# Patient Record
Sex: MALE | Race: WHITE | Employment: OTHER | URBAN - METROPOLITAN AREA
[De-identification: names, ages, dates, MRNs, and addresses within clinical notes are randomized per-mention and may not be internally consistent; named-entity substitution may affect disease eponyms.]

---

## 2017-01-24 ENCOUNTER — OP HISTORICAL/CONVERTED ENCOUNTER (OUTPATIENT)
Dept: OTHER | Age: 82
End: 2017-01-24

## 2017-04-04 ENCOUNTER — OP HISTORICAL/CONVERTED ENCOUNTER (OUTPATIENT)
Dept: OTHER | Age: 82
End: 2017-04-04

## 2017-04-19 ENCOUNTER — OP HISTORICAL/CONVERTED ENCOUNTER (OUTPATIENT)
Dept: OTHER | Age: 82
End: 2017-04-19

## 2017-05-12 ENCOUNTER — OP HISTORICAL/CONVERTED ENCOUNTER (OUTPATIENT)
Dept: OTHER | Age: 82
End: 2017-05-12

## 2018-02-01 ENCOUNTER — OP HISTORICAL/CONVERTED ENCOUNTER (OUTPATIENT)
Dept: OTHER | Age: 83
End: 2018-02-01

## 2018-07-02 ENCOUNTER — OP HISTORICAL/CONVERTED ENCOUNTER (OUTPATIENT)
Dept: OTHER | Age: 83
End: 2018-07-02

## 2018-11-05 ENCOUNTER — OP HISTORICAL/CONVERTED ENCOUNTER (OUTPATIENT)
Dept: OTHER | Age: 83
End: 2018-11-05

## 2020-01-21 ENCOUNTER — ED HISTORICAL/CONVERTED ENCOUNTER (OUTPATIENT)
Dept: OTHER | Age: 85
End: 2020-01-21

## 2020-05-06 LAB
CREATININE, EXTERNAL: 117
LDL-C, EXTERNAL: 139
PSA, EXTERNAL: 5.9

## 2020-05-08 ENCOUNTER — OP HISTORICAL/CONVERTED ENCOUNTER (OUTPATIENT)
Dept: OTHER | Age: 85
End: 2020-05-08

## 2020-10-06 VITALS
SYSTOLIC BLOOD PRESSURE: 130 MMHG | OXYGEN SATURATION: 98 % | DIASTOLIC BLOOD PRESSURE: 78 MMHG | BODY MASS INDEX: 23.13 KG/M2 | RESPIRATION RATE: 18 BRPM | HEIGHT: 70 IN | WEIGHT: 161.6 LBS | HEART RATE: 74 BPM | TEMPERATURE: 98.1 F

## 2020-10-06 PROBLEM — K21.9 GERD (GASTROESOPHAGEAL REFLUX DISEASE): Status: ACTIVE | Noted: 2020-10-06

## 2020-10-06 PROBLEM — I10 ESSENTIAL HYPERTENSION: Status: ACTIVE | Noted: 2020-10-06

## 2020-10-06 PROBLEM — M51.36 DEGENERATION OF LUMBAR INTERVERTEBRAL DISC: Status: ACTIVE | Noted: 2020-10-06

## 2020-10-06 PROBLEM — E55.9 VITAMIN D DEFICIENCY: Status: ACTIVE | Noted: 2020-10-06

## 2020-10-06 PROBLEM — G43.909 MIGRAINE: Status: ACTIVE | Noted: 2020-10-06

## 2020-10-06 PROBLEM — Z87.438 HISTORY OF BPH: Status: ACTIVE | Noted: 2020-10-06

## 2020-10-06 PROBLEM — N18.9 CHRONIC KIDNEY DISEASE (CKD): Status: ACTIVE | Noted: 2020-10-06

## 2020-10-06 PROBLEM — G56.00 CARPAL TUNNEL SYNDROME: Status: ACTIVE | Noted: 2020-10-06

## 2020-10-06 PROBLEM — Z86.79 HISTORY OF ATRIAL FIBRILLATION: Status: ACTIVE | Noted: 2020-10-06

## 2020-10-06 PROBLEM — E78.00 PURE HYPERCHOLESTEROLEMIA: Status: ACTIVE | Noted: 2020-10-06

## 2020-10-07 ENCOUNTER — OFFICE VISIT (OUTPATIENT)
Dept: INTERNAL MEDICINE CLINIC | Age: 85
End: 2020-10-07
Payer: MEDICARE

## 2020-10-07 VITALS
DIASTOLIC BLOOD PRESSURE: 74 MMHG | HEART RATE: 60 BPM | TEMPERATURE: 97 F | HEIGHT: 70 IN | BODY MASS INDEX: 23.45 KG/M2 | OXYGEN SATURATION: 98 % | RESPIRATION RATE: 18 BRPM | WEIGHT: 163.8 LBS | SYSTOLIC BLOOD PRESSURE: 122 MMHG

## 2020-10-07 DIAGNOSIS — M51.36 DEGENERATION OF LUMBAR INTERVERTEBRAL DISC: ICD-10-CM

## 2020-10-07 DIAGNOSIS — Z87.438 HISTORY OF BPH: ICD-10-CM

## 2020-10-07 DIAGNOSIS — I10 ESSENTIAL HYPERTENSION: Primary | ICD-10-CM

## 2020-10-07 DIAGNOSIS — E78.00 PURE HYPERCHOLESTEROLEMIA: ICD-10-CM

## 2020-10-07 DIAGNOSIS — Z23 NEEDS FLU SHOT: ICD-10-CM

## 2020-10-07 DIAGNOSIS — Z23 ENCOUNTER FOR IMMUNIZATION: ICD-10-CM

## 2020-10-07 DIAGNOSIS — K21.9 GASTROESOPHAGEAL REFLUX DISEASE WITHOUT ESOPHAGITIS: ICD-10-CM

## 2020-10-07 DIAGNOSIS — Z86.79 HISTORY OF ATRIAL FIBRILLATION: ICD-10-CM

## 2020-10-07 PROCEDURE — G8536 NO DOC ELDER MAL SCRN: HCPCS | Performed by: INTERNAL MEDICINE

## 2020-10-07 PROCEDURE — G8427 DOCREV CUR MEDS BY ELIG CLIN: HCPCS | Performed by: INTERNAL MEDICINE

## 2020-10-07 PROCEDURE — G8510 SCR DEP NEG, NO PLAN REQD: HCPCS | Performed by: INTERNAL MEDICINE

## 2020-10-07 PROCEDURE — 90756 CCIIV4 VACC ABX FREE IM: CPT | Performed by: INTERNAL MEDICINE

## 2020-10-07 PROCEDURE — 3288F FALL RISK ASSESSMENT DOCD: CPT | Performed by: INTERNAL MEDICINE

## 2020-10-07 PROCEDURE — 99213 OFFICE O/P EST LOW 20 MIN: CPT | Performed by: INTERNAL MEDICINE

## 2020-10-07 PROCEDURE — G0008 ADMIN INFLUENZA VIRUS VAC: HCPCS | Performed by: INTERNAL MEDICINE

## 2020-10-07 PROCEDURE — 1100F PTFALLS ASSESS-DOCD GE2>/YR: CPT | Performed by: INTERNAL MEDICINE

## 2020-10-07 PROCEDURE — G8420 CALC BMI NORM PARAMETERS: HCPCS | Performed by: INTERNAL MEDICINE

## 2020-10-07 RX ORDER — OMEPRAZOLE 40 MG/1
40 CAPSULE, DELAYED RELEASE ORAL DAILY
Qty: 90 CAP | Refills: 3 | Status: SHIPPED | OUTPATIENT
Start: 2020-10-07 | End: 2020-10-07 | Stop reason: SDUPTHER

## 2020-10-07 RX ORDER — ZOLMITRIPTAN 2.5 MG/1
2.5 TABLET, FILM COATED ORAL AS NEEDED
Qty: 12 TAB | Refills: 3 | Status: SHIPPED | OUTPATIENT
Start: 2020-10-07 | End: 2022-03-10 | Stop reason: SDDI

## 2020-10-07 RX ORDER — OMEPRAZOLE 40 MG/1
40 CAPSULE, DELAYED RELEASE ORAL DAILY
Qty: 90 CAP | Refills: 3 | Status: SHIPPED | OUTPATIENT
Start: 2020-10-07 | End: 2022-03-10 | Stop reason: SDDI

## 2020-10-07 RX ORDER — ZOLMITRIPTAN 2.5 MG/1
1 TABLET, FILM COATED ORAL AS NEEDED
COMMUNITY
Start: 2020-04-13 | End: 2020-10-07 | Stop reason: SDUPTHER

## 2020-10-07 RX ORDER — OMEPRAZOLE 40 MG/1
1 CAPSULE, DELAYED RELEASE ORAL DAILY
COMMUNITY
End: 2020-10-07 | Stop reason: SDUPTHER

## 2020-10-07 NOTE — PROGRESS NOTES
John Matt is a 80 y.o. male and presents with Hypertension; Cholesterol Problem; and Follow Up Chronic Condition    He has stopped taking diltiazem and pravastatin, he has his own fixed thoughts, today his blood pressure readings are,Controlled, he has history of atrial fibrillation, he is not wearing hearing aid machine in the office also which  makes it challenging for me for conversation, he needs refills for omeprazole he thinks that he has headache due to migraine and he keeps taking Zomig as needed, he does not want to try any Tylenol, at all and does not want to get off from Zomig, he also follows at, [facility] he does not have dizziness he does not want to use cane for balance, sometimes he told me he cannot walk straight, however at the same time, he does not want to use cane for support or to prevent fall he eats every day at [facility], his wife cannot prepare the meal having macular degeneration, he does not have depression, he has a history of DJD of joints and BPH and sciatica but he thinks everything is in control, hypertension      Review of Systems    Review of Systems   Constitutional: Negative. HENT: Positive for hearing loss. Negative for sinus pain. Dose not wear hearing aid all the time. he has chronic hearing loss. Eyes: Negative for blurred vision. Respiratory: Negative for cough, shortness of breath and wheezing. Cardiovascular: Negative. Gastrointestinal: Negative. Genitourinary: Negative. Musculoskeletal: Negative. Neurological: Negative for dizziness, tingling, tremors and headaches. Psychiatric/Behavioral: Negative for depression. The patient is not nervous/anxious.          Past Medical History:   Diagnosis Date    Arrhythmia     Arthritis     Calculus of kidney     GERD (gastroesophageal reflux disease)     Headache     Hypercholesterolemia     Hypertension     Prostate disease      Past Surgical History:   Procedure Laterality Date    HX CHOLECYSTECTOMY      HX COLONOSCOPY      HX GI      HX PROSTATE SURGERY      prostate biopsy      Social History     Socioeconomic History    Marital status:      Spouse name: Not on file    Number of children: Not on file    Years of education: Not on file    Highest education level: Not on file   Tobacco Use    Smoking status: Never Smoker    Smokeless tobacco: Never Used   Substance and Sexual Activity    Alcohol use: Not Currently    Drug use: Never     Family History   Problem Relation Age of Onset    Heart Disease Mother     No Known Problems Father      Current Outpatient Medications   Medication Sig Dispense Refill    ZOLMitriptan (ZOMIG) 2.5 mg tablet Take 1 Tab by mouth as needed for Migraine. Indications: a migraine headache 12 Tab 3    omeprazole (PRILOSEC) 40 mg capsule Take 1 Cap by mouth daily. Take 30 minutes before eating once a day. Indications: gastroesophageal reflux disease 90 Cap 3     Allergies   Allergen Reactions    Hydrocodone Other (comments)     Side effects in form of balance problem    Sulfa (Sulfonamide Antibiotics) Rash       Objective:  Visit Vitals  /74 (BP 1 Location: Right arm, BP Patient Position: Sitting)   Pulse 60   Temp 97 °F (36.1 °C) (Temporal)   Resp 18   Ht 5' 9.5\" (1.765 m)   Wt 163 lb 12.8 oz (74.3 kg)   SpO2 98%   BMI 23.84 kg/m²       Physical Exam:   Constitutional: General Appearance: age appropriate. . Level of Distress: NAD. Psychiatric: Mental Status: normal mood and affect Orientation: to time, place, and person. ,normal eye contact. Head: Head: normocephalic and atraumatic. Eyes: Pupils: PERRLA. Sclerae: non-icteric. Neck: Neck: supple, trachea midline, and no masses. Lymph Nodes: no cervical LAD. Thyroid: no enlargement or nodules and non-tender. Lungs: Respiratory effort: no dyspnea. Auscultation: no wheezing, rales/crackles, or rhonchi and breath sounds normal and good air movement.   Cardiovascular: Apical Impulse: not displaced. Heart Auscultation: normal S1 and S2; no murmurs, rubs, or gallops; and RRR. Neck vessels: no carotid bruits. Pulses including femoral / pedal: normal throughout. Abdomen: Bowel Sounds: normal. Inspection and Palpation: no tenderness, guarding, or masses and soft and non-distended. Liver: non-tender and no hepatomegaly. Spleen: non-tender and no splenomegaly. Musculoskeletal[de-identified] Extremities: no edema,no varicosities. No Calf tenderness. Neurologic: Gait and Station: normal gait and station. Motor Strength normal right and left. Skin: Inspection and palpation: no rash, lesions, or ulcer. Results for orders placed or performed in visit on 10/06/20   AMB EXT LDL-C   Result Value Ref Range    LDL-C, External 139    AMB EXT CREATININE   Result Value Ref Range    Creatinine, External 117    AMB EXT PSA   Result Value Ref Range    PSA, External 5.9        Assessment/Plan:      ICD-10-CM ICD-9-CM    1. Essential hypertension  I10 401.9 CBC WITH AUTOMATED DIFF      METABOLIC PANEL, COMPREHENSIVE   2. Gastroesophageal reflux disease without esophagitis  K21.9 530.81    3. Pure hypercholesterolemia  E78.00 272.0 LIPID PANEL   4. Degeneration of lumbar intervertebral disc  M51.36 722.52    5. History of atrial fibrillation  Z86.79 V12.59    6. History of BPH  Z87.438 V13.89    7. Encounter for immunization  Z23 V03.89      Orders Placed This Encounter    CBC WITH AUTOMATED DIFF    METABOLIC PANEL, COMPREHENSIVE    LIPID PANEL    DISCONTD: ZOLMitriptan (ZOMIG) 2.5 mg tablet     Sig: Take 1 Tab by mouth as needed.  DISCONTD: omeprazole (PRILOSEC) 40 mg capsule     Sig: Take 1 Cap by mouth daily.  DISCONTD: omeprazole (PRILOSEC) 40 mg capsule     Sig: Take 1 Cap by mouth daily. Take 30 minutes before eating once a day.   Indications: gastroesophageal reflux disease     Dispense:  90 Cap     Refill:  3    ZOLMitriptan (ZOMIG) 2.5 mg tablet     Sig: Take 1 Tab by mouth as needed for Migraine. Indications: a migraine headache     Dispense:  12 Tab     Refill:  3    omeprazole (PRILOSEC) 40 mg capsule     Sig: Take 1 Cap by mouth daily. Take 30 minutes before eating once a day. Indications: gastroesophageal reflux disease     Dispense:  90 Cap     Refill:  3     Hypertension with history of atrial fibrillation he had stopped taking diltiazem, ER  120 mg once a day by himself, he did not bring blood pressure log. [facility] better diet low in sodium and DASH diet.    , History of atrial fibrillation status post surgery and no recurrence he does not want to be on low-dose aspirin. History of BPH currently asymptomatic and had biopsy which was negative for malignancy in the past he had elevated PSA. History of migraine, he does not want to get off from Zomig he needs refills he does not want to try one Tylenol, he thinks that Zomig is the only medicine that is helping him and helped him in the past.    GERD he is eating outside at [facility] he has no more weight loss his wife cannot prepare the meal due to macular degeneration of eyes both of them are eating every day for lunch outside I ordered labs, he wants omeprazole and he takes every day,Refill sent to [facility]. Education and counseling given. Flu vaccine given in the office. Labs ordered for next 3 months and follow-up in 3 months. Strongly recommended to wear hearing aid while in the office only during, visit.,  For the better understanding and communication, according to him it is uncomfortable with the mask and with his glasses however if he wear, during medical conversation, for short period of time for 10 minutes it will be better, explained him.         bring BP log to office visit, follow low fat diet, follow low salt diet, routine labs ordered, have labs drawn prior to ROV, call if any problems    There are no Patient Instructions on file for this visit. Follow-up and Dispositions    · Return in about 3 months (around 1/7/2021) for htn ,servando ,fasting labs in 3 months.

## 2020-11-06 PROBLEM — Z23 ENCOUNTER FOR IMMUNIZATION: Status: RESOLVED | Noted: 2020-10-07 | Resolved: 2020-11-06

## 2021-01-05 LAB
ALBUMIN SERPL-MCNC: 4.2 G/DL (ref 3.6–4.6)
ALBUMIN/GLOB SERPL: 1.6 {RATIO} (ref 1.2–2.2)
ALP SERPL-CCNC: 95 IU/L (ref 39–117)
ALT SERPL-CCNC: 14 IU/L (ref 0–44)
AST SERPL-CCNC: 15 IU/L (ref 0–40)
BASOPHILS # BLD AUTO: 0.1 X10E3/UL (ref 0–0.2)
BASOPHILS NFR BLD AUTO: 1 %
BILIRUB SERPL-MCNC: 0.5 MG/DL (ref 0–1.2)
BUN SERPL-MCNC: 15 MG/DL (ref 8–27)
BUN/CREAT SERPL: 12 (ref 10–24)
CALCIUM SERPL-MCNC: 9.2 MG/DL (ref 8.6–10.2)
CHLORIDE SERPL-SCNC: 107 MMOL/L (ref 96–106)
CHOLEST SERPL-MCNC: 170 MG/DL (ref 100–199)
CO2 SERPL-SCNC: 21 MMOL/L (ref 20–29)
CREAT SERPL-MCNC: 1.21 MG/DL (ref 0.76–1.27)
EOSINOPHIL # BLD AUTO: 0.2 X10E3/UL (ref 0–0.4)
EOSINOPHIL NFR BLD AUTO: 4 %
ERYTHROCYTE [DISTWIDTH] IN BLOOD BY AUTOMATED COUNT: 12.9 % (ref 11.6–15.4)
GLOBULIN SER CALC-MCNC: 2.7 G/DL (ref 1.5–4.5)
GLUCOSE SERPL-MCNC: 86 MG/DL (ref 65–99)
HCT VFR BLD AUTO: 42.9 % (ref 37.5–51)
HDLC SERPL-MCNC: 61 MG/DL
HGB BLD-MCNC: 15 G/DL (ref 13–17.7)
IMM GRANULOCYTES # BLD AUTO: 0 X10E3/UL (ref 0–0.1)
IMM GRANULOCYTES NFR BLD AUTO: 0 %
LDLC SERPL CALC-MCNC: 98 MG/DL (ref 0–99)
LYMPHOCYTES # BLD AUTO: 1.6 X10E3/UL (ref 0.7–3.1)
LYMPHOCYTES NFR BLD AUTO: 28 %
MCH RBC QN AUTO: 32.1 PG (ref 26.6–33)
MCHC RBC AUTO-ENTMCNC: 35 G/DL (ref 31.5–35.7)
MCV RBC AUTO: 92 FL (ref 79–97)
MONOCYTES # BLD AUTO: 0.6 X10E3/UL (ref 0.1–0.9)
MONOCYTES NFR BLD AUTO: 10 %
NEUTROPHILS # BLD AUTO: 3.3 X10E3/UL (ref 1.4–7)
NEUTROPHILS NFR BLD AUTO: 57 %
PLATELET # BLD AUTO: 140 X10E3/UL (ref 150–450)
POTASSIUM SERPL-SCNC: 4 MMOL/L (ref 3.5–5.2)
PROT SERPL-MCNC: 6.9 G/DL (ref 6–8.5)
RBC # BLD AUTO: 4.67 X10E6/UL (ref 4.14–5.8)
SODIUM SERPL-SCNC: 142 MMOL/L (ref 134–144)
TRIGL SERPL-MCNC: 53 MG/DL (ref 0–149)
VLDLC SERPL CALC-MCNC: 11 MG/DL (ref 5–40)
WBC # BLD AUTO: 5.8 X10E3/UL (ref 3.4–10.8)

## 2021-01-07 ENCOUNTER — OFFICE VISIT (OUTPATIENT)
Dept: INTERNAL MEDICINE CLINIC | Age: 86
End: 2021-01-07
Payer: MEDICARE

## 2021-01-07 VITALS
WEIGHT: 164.2 LBS | BODY MASS INDEX: 23.51 KG/M2 | HEART RATE: 60 BPM | SYSTOLIC BLOOD PRESSURE: 130 MMHG | DIASTOLIC BLOOD PRESSURE: 83 MMHG | OXYGEN SATURATION: 98 % | HEIGHT: 70 IN | RESPIRATION RATE: 16 BRPM

## 2021-01-07 DIAGNOSIS — G43.909 MIGRAINE WITHOUT STATUS MIGRAINOSUS, NOT INTRACTABLE, UNSPECIFIED MIGRAINE TYPE: ICD-10-CM

## 2021-01-07 DIAGNOSIS — Z86.79 HISTORY OF ATRIAL FIBRILLATION: ICD-10-CM

## 2021-01-07 DIAGNOSIS — K21.9 GASTROESOPHAGEAL REFLUX DISEASE WITHOUT ESOPHAGITIS: ICD-10-CM

## 2021-01-07 DIAGNOSIS — I10 ESSENTIAL HYPERTENSION: ICD-10-CM

## 2021-01-07 DIAGNOSIS — E78.00 PURE HYPERCHOLESTEROLEMIA: ICD-10-CM

## 2021-01-07 DIAGNOSIS — M51.36 DEGENERATION OF LUMBAR INTERVERTEBRAL DISC: Primary | ICD-10-CM

## 2021-01-07 DIAGNOSIS — Z87.438 HISTORY OF BPH: ICD-10-CM

## 2021-01-07 PROCEDURE — 99214 OFFICE O/P EST MOD 30 MIN: CPT | Performed by: INTERNAL MEDICINE

## 2021-01-07 RX ORDER — PRAVASTATIN SODIUM 40 MG/1
40 TABLET ORAL
COMMUNITY
End: 2021-07-07 | Stop reason: SDDI

## 2021-01-07 RX ORDER — DILTIAZEM HYDROCHLORIDE 120 MG/1
120 CAPSULE, EXTENDED RELEASE ORAL DAILY
COMMUNITY
End: 2021-12-13 | Stop reason: SDUPTHER

## 2021-01-07 NOTE — PROGRESS NOTES
Berna Barnard is a 80 y.o. male and presents with Follow Up Chronic Condition (htn ,servando )    Mr. Janetta Cogan came and, today his blood pressure is wonderfully controlled no recent flareup of migraine, his wife is having macular degeneration, she cannot cook or prepare the meal at home, Mr. Janetta Cogan  takes lunch and brunch at [facility] in maintaining his nutrition, he is trying to eat balanced diet, no flareup of acid reflux he has impaired hearing since long he does not wear hearing aid every day, he has history of atrial fibrillation but currently none and history of sciatica but currently asymptomatic independent for ADL and IADL and his wife had hemoglobin around 7 who did not go to to take PRBC, and patient wanted name of hematologist that she used to see and I prepared referral for his wife and I prepared prescription for his wife, while he was here. He is going to see primary care physician at [facility] in June and will do his blood work that he wants to see me after month of June. Today I reviewed his labs and discussed with him. His labs are stable at his age of 80. He has no negative thoughts or depression. Review of Systems    Review of Systems   Constitutional: Negative. HENT: Negative for sinus pain and sore throat. Eyes: Negative for blurred vision. Respiratory: Negative for cough, shortness of breath and wheezing. Cardiovascular: Negative for chest pain and PND. Gastrointestinal: Negative. Genitourinary: Negative. Musculoskeletal: Negative. Neurological: Negative for dizziness, tingling, tremors and headaches. Endo/Heme/Allergies: Negative for polydipsia. Psychiatric/Behavioral: Negative for depression. The patient does not have insomnia.          Past Medical History:   Diagnosis Date    Arrhythmia     Arthritis     Calculus of kidney     GERD (gastroesophageal reflux disease)     Headache     Hypercholesterolemia     Hypertension     Prostate disease      Past Surgical History:   Procedure Laterality Date    HX CHOLECYSTECTOMY      HX COLONOSCOPY      HX GI      HX PROSTATE SURGERY      prostate biopsy      Social History     Socioeconomic History    Marital status:      Spouse name: Not on file    Number of children: Not on file    Years of education: Not on file    Highest education level: Not on file   Tobacco Use    Smoking status: Never Smoker    Smokeless tobacco: Never Used   Substance and Sexual Activity    Alcohol use: Not Currently    Drug use: Never     Family History   Problem Relation Age of Onset    Heart Disease Mother     No Known Problems Father      Current Outpatient Medications   Medication Sig Dispense Refill    dilTIAZem ER (TIAZAC) 120 mg capsule Take 120 mg by mouth daily.  pravastatin (PRAVACHOL) 40 mg tablet Take 40 mg by mouth nightly.  ZOLMitriptan (ZOMIG) 2.5 mg tablet Take 1 Tab by mouth as needed for Migraine. Indications: a migraine headache 12 Tab 3    omeprazole (PRILOSEC) 40 mg capsule Take 1 Cap by mouth daily. Take 30 minutes before eating once a day. Indications: gastroesophageal reflux disease 90 Cap 3     Allergies   Allergen Reactions    Hydrocodone Other (comments)     Side effects in form of balance problem    Sulfa (Sulfonamide Antibiotics) Rash       Objective:  Visit Vitals  /83 (BP 1 Location: Left arm, BP Patient Position: Sitting)   Pulse 60   Resp 16   Ht 5' 9.5\" (1.765 m)   Wt 164 lb 3.2 oz (74.5 kg)   SpO2 98%   BMI 23.90 kg/m²       Physical Exam:   Constitutional: General Appearance: Pleasant. Level of Distress: NAD. Psychiatric: Mental Status: normal mood and affect Orientation: to time, place, and person. ,normal eye contact. Head: Head: normocephalic and atraumatic. Eyes: Pupils: PERRLA. Sclerae: non-icteric. Neck: Neck: supple, trachea midline, and no masses. Lymph Nodes: no cervical LAD. Thyroid: no enlargement or nodules and non-tender.    Lungs: Respiratory effort: no dyspnea. Auscultation: no wheezing, rales/crackles, or rhonchi and breath sounds normal and good air movement. Cardiovascular: Apical Impulse: not displaced. Heart Auscultation: normal S1 and S2; no murmurs, rubs, or gallops; and RRR. Neck vessels: no carotid bruits. Pulses including femoral / pedal: normal throughout. Abdomen: Bowel Sounds: normal. Inspection and Palpation: no tenderness, guarding, or masses and soft and non-distended. Liver: non-tender and no hepatomegaly. Spleen: non-tender and no splenomegaly. Musculoskeletal[de-identified] Extremities: no edema,no varicosities. No Calf tenderness. Neurologic: Gait and Station: normal gait and station. Motor Strength normal right and left. Skin: Inspection and palpation: no rash, lesions, or ulcer. Results for orders placed or performed in visit on 10/07/20   CBC WITH AUTOMATED DIFF   Result Value Ref Range    WBC 5.8 3.4 - 10.8 x10E3/uL    RBC 4.67 4.14 - 5.80 x10E6/uL    HGB 15.0 13.0 - 17.7 g/dL    HCT 42.9 37.5 - 51.0 %    MCV 92 79 - 97 fL    MCH 32.1 26.6 - 33.0 pg    MCHC 35.0 31.5 - 35.7 g/dL    RDW 12.9 11.6 - 15.4 %    PLATELET 476 (L) 509 - 450 x10E3/uL    NEUTROPHILS 57 Not Estab. %    Lymphocytes 28 Not Estab. %    MONOCYTES 10 Not Estab. %    EOSINOPHILS 4 Not Estab. %    BASOPHILS 1 Not Estab. %    ABS. NEUTROPHILS 3.3 1.4 - 7.0 x10E3/uL    Abs Lymphocytes 1.6 0.7 - 3.1 x10E3/uL    ABS. MONOCYTES 0.6 0.1 - 0.9 x10E3/uL    ABS. EOSINOPHILS 0.2 0.0 - 0.4 x10E3/uL    ABS. BASOPHILS 0.1 0.0 - 0.2 x10E3/uL    IMMATURE GRANULOCYTES 0 Not Estab. %    ABS. IMM.  GRANS. 0.0 0.0 - 0.1 Q25G9/KK   METABOLIC PANEL, COMPREHENSIVE   Result Value Ref Range    Glucose 86 65 - 99 mg/dL    BUN 15 8 - 27 mg/dL    Creatinine 1.21 0.76 - 1.27 mg/dL    GFR est non-AA 54 (L) >59 mL/min/1.73    GFR est AA 62 >59 mL/min/1.73    BUN/Creatinine ratio 12 10 - 24    Sodium 142 134 - 144 mmol/L    Potassium 4.0 3.5 - 5.2 mmol/L    Chloride 107 (H) 96 - 106 mmol/L    CO2 21 20 - 29 mmol/L    Calcium 9.2 8.6 - 10.2 mg/dL    Protein, total 6.9 6.0 - 8.5 g/dL    Albumin 4.2 3.6 - 4.6 g/dL    GLOBULIN, TOTAL 2.7 1.5 - 4.5 g/dL    A-G Ratio 1.6 1.2 - 2.2    Bilirubin, total 0.5 0.0 - 1.2 mg/dL    Alk. phosphatase 95 39 - 117 IU/L    AST (SGOT) 15 0 - 40 IU/L    ALT (SGPT) 14 0 - 44 IU/L   LIPID PANEL   Result Value Ref Range    Cholesterol, total 170 100 - 199 mg/dL    Triglyceride 53 0 - 149 mg/dL    HDL Cholesterol 61 >39 mg/dL    VLDL, calculated 11 5 - 40 mg/dL    LDL, calculated 98 0 - 99 mg/dL       Assessment/Plan:      ICD-10-CM ICD-9-CM    1. Degeneration of lumbar intervertebral disc  M51.36 722.52    2. Essential hypertension  I10 401.9    3. Pure hypercholesterolemia  E78.00 272.0    4. Gastroesophageal reflux disease without esophagitis  K21.9 530.81    5. Migraine without status migrainosus, not intractable, unspecified migraine type  G43.909 346.90    6. History of atrial fibrillation  Z86.79 V12.59    7. History of BPH  Z87.438 V13.89      Orders Placed This Encounter    dilTIAZem ER (TIAZAC) 120 mg capsule     Sig: Take 120 mg by mouth daily.  pravastatin (PRAVACHOL) 40 mg tablet     Sig: Take 40 mg by mouth nightly. Hypertension controlled continue diltiazem  mg once a day. Diet low in sodium. Hypercholesteremia controlled continue pravastatin 40 mg at bedtime. Diet low in fat. GERD continue omeprazole. He told me he has made changes in his diet that he eats at [facility].    History of migraine taking zolmitriptan as needed. History of sciatica with lumbar DJD currently asymptomatic. History of elevated PSA in the past but he had undergone biopsy and it was normal and he checks with [facility] and his PSA remained stable and he has no symptoms of abnormal urination. History of atrial fibrillation once status post ablation surgery and currently asymptomatic.     He also follows at [facility] with PCP and does his labs in June. Follow-up in 6 months. Answered all his questions and answered the questions for his wife sickness having anemia and I did everything for his wife also regarding prescription and referral to hematologist for his wife having anemia with thalassemia. His wife has macular degeneration. Who is my patient. Patient has no depression and independent for ADL and IADL and up to date for his flu vaccine. Patient has impaired hearing. Does not wear hearing aid machine daily. continue present plan, call if any problems    There are no Patient Instructions on file for this visit. Follow-up and Dispositions    · Return in about 6 months (around 7/7/2021) for htn ,servando ,migraine ,gerd.

## 2021-07-07 ENCOUNTER — OFFICE VISIT (OUTPATIENT)
Dept: INTERNAL MEDICINE CLINIC | Age: 86
End: 2021-07-07
Payer: MEDICARE

## 2021-07-07 VITALS
DIASTOLIC BLOOD PRESSURE: 80 MMHG | HEART RATE: 60 BPM | RESPIRATION RATE: 12 BRPM | BODY MASS INDEX: 24.14 KG/M2 | OXYGEN SATURATION: 97 % | HEIGHT: 70 IN | SYSTOLIC BLOOD PRESSURE: 138 MMHG | WEIGHT: 168.6 LBS

## 2021-07-07 DIAGNOSIS — Z87.438 HISTORY OF BPH: ICD-10-CM

## 2021-07-07 DIAGNOSIS — Z86.79 HISTORY OF ATRIAL FIBRILLATION: ICD-10-CM

## 2021-07-07 DIAGNOSIS — I10 ESSENTIAL HYPERTENSION: ICD-10-CM

## 2021-07-07 DIAGNOSIS — R97.20 ELEVATED PSA: ICD-10-CM

## 2021-07-07 DIAGNOSIS — K21.9 GASTROESOPHAGEAL REFLUX DISEASE WITHOUT ESOPHAGITIS: ICD-10-CM

## 2021-07-07 DIAGNOSIS — G43.909 MIGRAINE WITHOUT STATUS MIGRAINOSUS, NOT INTRACTABLE, UNSPECIFIED MIGRAINE TYPE: ICD-10-CM

## 2021-07-07 DIAGNOSIS — E78.00 PURE HYPERCHOLESTEROLEMIA: ICD-10-CM

## 2021-07-07 PROCEDURE — G8427 DOCREV CUR MEDS BY ELIG CLIN: HCPCS | Performed by: INTERNAL MEDICINE

## 2021-07-07 PROCEDURE — 99214 OFFICE O/P EST MOD 30 MIN: CPT | Performed by: INTERNAL MEDICINE

## 2021-07-07 PROCEDURE — G8510 SCR DEP NEG, NO PLAN REQD: HCPCS | Performed by: INTERNAL MEDICINE

## 2021-07-07 PROCEDURE — 3288F FALL RISK ASSESSMENT DOCD: CPT | Performed by: INTERNAL MEDICINE

## 2021-07-07 PROCEDURE — G8536 NO DOC ELDER MAL SCRN: HCPCS | Performed by: INTERNAL MEDICINE

## 2021-07-07 PROCEDURE — G8420 CALC BMI NORM PARAMETERS: HCPCS | Performed by: INTERNAL MEDICINE

## 2021-07-07 PROCEDURE — 1100F PTFALLS ASSESS-DOCD GE2>/YR: CPT | Performed by: INTERNAL MEDICINE

## 2021-07-07 RX ORDER — HYDROXYZINE HYDROCHLORIDE 10 MG/1
40 TABLET, FILM COATED ORAL
COMMUNITY
End: 2022-03-10

## 2021-07-07 RX ORDER — HYDROCORTISONE VALERATE 2 MG/G
OINTMENT TOPICAL
COMMUNITY
Start: 2021-07-06 | End: 2022-03-10 | Stop reason: ALTCHOICE

## 2021-07-07 RX ORDER — HYDROXYZINE HYDROCHLORIDE 10 MG/1
4 TABLET, FILM COATED ORAL DAILY
COMMUNITY
Start: 2021-07-06 | End: 2021-07-07

## 2021-07-07 RX ORDER — PRAVASTATIN SODIUM 10 MG/1
10 TABLET ORAL
Qty: 90 TABLET | Refills: 0 | Status: SHIPPED | OUTPATIENT
Start: 2021-07-07 | End: 2021-12-13 | Stop reason: SDUPTHER

## 2021-07-07 NOTE — PROGRESS NOTES
Chief Complaint   Patient presents with    Follow Up Chronic Condition    Hypertension    Migraine    Vitamin D Deficiency    Chronic Kidney Disease     1. Have you been to the ER, urgent care clinic since your last visit? Hospitalized since your last visit? No    2. Have you seen or consulted any other health care providers outside of the [address] [place] [address] since your last visit? Include any pap smears or colon screening. Ward Room June 2021 yearly checkup Dermatology June 3rd allergic reaction     Visit Vitals  BP (!) 142/82 (BP 1 Location: Left upper arm, BP Patient Position: Sitting, BP Cuff Size: Adult)   Pulse 61   Resp 12   Ht 5' 9.5\" (1.765 m)   Wt 168 lb 9.6 oz (76.5 kg)   SpO2 97%   BMI 24.54 kg/m²     No refills needed.

## 2021-07-07 NOTE — PROGRESS NOTES
Joelle Wang is a 80 y.o. male and presents with Follow Up Chronic Condition, Hypertension, Migraine, Vitamin D Deficiency, and Chronic Kidney Disease    . Hodan Lewis for regular follow-up. He follows dermatologist.  He had some rash on his skin on the back taking hydroxyzine and the skin cream and follows dermatologist.  His blood pressure is almost controlled. he does not like to make changes in his existing medicine. He told me he has migraine it has never gone away but he takes triptan  and immediately is headache go away with 2.5 mg of Zomig, he has stopped taking pravastatin 40 mg he brought his labs done in June at [facility] he follows at [facility] once a year with primary care physician, his lipid profile and total cholesterol is slightly on upper side with  and total cholesterol 226. He used to take pravastatin 40 mg at bedtime started on 10 mg at bedtime he has bilateral hearing problems he has not wear his hearing aid machine. No depression or negative thoughts. He has history of elevated PSA in the past but he had a biopsy done with urologist which was benign and he is total PSA is now 5.54 on seventh Sara compared to 6.52 on fifth Sara. He does not have any urinary problems. Review of Systems    Review of Systems   Constitutional: Negative. HENT: Negative for sinus pain and sore throat. Eyes: Negative for blurred vision. Respiratory: Negative for cough, sputum production, shortness of breath and wheezing. Cardiovascular: Negative. Gastrointestinal: Negative for diarrhea and heartburn. Genitourinary: Negative. Negative for dysuria, flank pain and frequency. Musculoskeletal: Negative for back pain, falls, joint pain and myalgias. Skin: Positive for itching and rash. History of itching and rash taking cream and hydroxyzine by dermatologist.   Neurological: Negative for dizziness, tingling, sensory change and focal weakness.    Psychiatric/Behavioral: Negative for depression, hallucinations and substance abuse. The patient is not nervous/anxious and does not have insomnia. Past Medical History:   Diagnosis Date    Arrhythmia     Arthritis     Calculus of kidney     GERD (gastroesophageal reflux disease)     Headache     Hypercholesterolemia     Hypertension     Prostate disease      Past Surgical History:   Procedure Laterality Date    HX CHOLECYSTECTOMY      HX COLONOSCOPY      HX GI      HX PROSTATE SURGERY      prostate biopsy      Social History     Socioeconomic History    Marital status:      Spouse name: Not on file    Number of children: Not on file    Years of education: Not on file    Highest education level: Not on file   Tobacco Use    Smoking status: Never Smoker    Smokeless tobacco: Never Used   Substance and Sexual Activity    Alcohol use: Not Currently    Drug use: Never     Social Determinants of Health     Financial Resource Strain:     Difficulty of Paying Living Expenses:    Food Insecurity:     Worried About Running Out of Food in the Last Year:     Ran Out of Food in the Last Year:    Transportation Needs:     Lack of Transportation (Medical):  Lack of Transportation (Non-Medical):    Physical Activity:     Days of Exercise per Week:     Minutes of Exercise per Session:    Stress:     Feeling of Stress :    Social Connections:     Frequency of Communication with Friends and Family:     Frequency of Social Gatherings with Friends and Family:     Attends Mu-ism Services:     Active Member of Clubs or Organizations:     Attends Club or Organization Meetings:     Marital Status:      Family History   Problem Relation Age of Onset    Heart Disease Mother     No Known Problems Father      Current Outpatient Medications   Medication Sig Dispense Refill    hydrocortisone valerate (WEST-CYN) 0.2 % ointment       pravastatin (PRAVACHOL) 10 mg tablet Take 1 Tablet by mouth nightly.  90 Tablet 0    hydrOXYzine HCL (ATARAX) 10 mg tablet Take 40 mg by mouth.  dilTIAZem ER (TIAZAC) 120 mg capsule Take 120 mg by mouth daily.  ZOLMitriptan (ZOMIG) 2.5 mg tablet Take 1 Tab by mouth as needed for Migraine. Indications: a migraine headache 12 Tab 3    omeprazole (PRILOSEC) 40 mg capsule Take 1 Cap by mouth daily. Take 30 minutes before eating once a day. Indications: gastroesophageal reflux disease 90 Cap 3     Allergies   Allergen Reactions    Hydrocodone Other (comments)     Side effects in form of balance problem    Sulfa (Sulfonamide Antibiotics) Rash       Objective:  Visit Vitals  /80 (BP 1 Location: Right upper arm, BP Patient Position: Sitting, BP Cuff Size: Adult)   Pulse 60   Resp 12   Ht 5' 9.5\" (1.765 m)   Wt 168 lb 9.6 oz (76.5 kg)   SpO2 97%   BMI 24.54 kg/m²       Physical Exam:   Constitutional: General Appearance: . Pleasant level of Distress: NAD. Psychiatric: Mental Status: normal mood and affect Orientation: to time, place, and person. ,normal eye contact. Head: Head: normocephalic and atraumatic. Wearing hearing aid for impaired hearing today has not wear  Eyes: Pupils: PERRLA. Sclerae: non-icteric. Neck: Neck: supple, trachea midline, and no masses. Lymph Nodes: no cervical LAD. Thyroid: no enlargement or nodules and non-tender. Lungs: Respiratory effort: no dyspnea. Auscultation: no wheezing, rales/crackles, or rhonchi and breath sounds normal and good air movement. Cardiovascular: Apical Impulse: not displaced. Heart Auscultation: normal S1 and S2; no murmurs, rubs, or gallops; and RRR. Neck vessels: no carotid bruits. Pulses including femoral / pedal: normal throughout. Abdomen: Bowel Sounds: normal. Inspection and Palpation: no tenderness, guarding, or masses and soft and non-distended. Liver: non-tender and no hepatomegaly. Spleen: non-tender and no splenomegaly. Musculoskeletal[de-identified] Extremities: no edema,no varicosities. No Calf tenderness.   Neurologic: Gait and Station: normal gait and station. Motor Strength normal right and left. Skin: Inspection and palpation: no rash, lesions, or ulcer. Results for orders placed or performed in visit on 10/07/20   CBC WITH AUTOMATED DIFF   Result Value Ref Range    WBC 5.8 3.4 - 10.8 x10E3/uL    RBC 4.67 4.14 - 5.80 x10E6/uL    HGB 15.0 13.0 - 17.7 g/dL    HCT 42.9 37.5 - 51.0 %    MCV 92 79 - 97 fL    MCH 32.1 26.6 - 33.0 pg    MCHC 35.0 31.5 - 35.7 g/dL    RDW 12.9 11.6 - 15.4 %    PLATELET 105 (L) 203 - 450 x10E3/uL    NEUTROPHILS 57 Not Estab. %    Lymphocytes 28 Not Estab. %    MONOCYTES 10 Not Estab. %    EOSINOPHILS 4 Not Estab. %    BASOPHILS 1 Not Estab. %    ABS. NEUTROPHILS 3.3 1.4 - 7.0 x10E3/uL    Abs Lymphocytes 1.6 0.7 - 3.1 x10E3/uL    ABS. MONOCYTES 0.6 0.1 - 0.9 x10E3/uL    ABS. EOSINOPHILS 0.2 0.0 - 0.4 x10E3/uL    ABS. BASOPHILS 0.1 0.0 - 0.2 x10E3/uL    IMMATURE GRANULOCYTES 0 Not Estab. %    ABS. IMM. GRANS. 0.0 0.0 - 0.1 [id number]   METABOLIC PANEL, COMPREHENSIVE   Result Value Ref Range    Glucose 86 65 - 99 mg/dL    BUN 15 8 - 27 mg/dL    Creatinine 1.21 0.76 - 1.27 mg/dL    GFR est non-AA 54 (L) >59 mL/min/1.73    GFR est AA 62 >59 mL/min/1.73    BUN/Creatinine ratio 12 10 - 24    Sodium 142 134 - 144 mmol/L    Potassium 4.0 3.5 - 5.2 mmol/L    Chloride 107 (H) 96 - 106 mmol/L    CO2 21 20 - 29 mmol/L    Calcium 9.2 8.6 - 10.2 mg/dL    Protein, total 6.9 6.0 - 8.5 g/dL    Albumin 4.2 3.6 - 4.6 g/dL    GLOBULIN, TOTAL 2.7 1.5 - 4.5 g/dL    A-G Ratio 1.6 1.2 - 2.2    Bilirubin, total 0.5 0.0 - 1.2 mg/dL    Alk. phosphatase 95 39 - 117 IU/L    AST (SGOT) 15 0 - 40 IU/L    ALT (SGPT) 14 0 - 44 IU/L   LIPID PANEL   Result Value Ref Range    Cholesterol, total 170 100 - 199 mg/dL    Triglyceride 53 0 - 149 mg/dL    HDL Cholesterol 61 >39 mg/dL    VLDL, calculated 11 5 - 40 mg/dL    LDL, calculated 98 0 - 99 mg/dL       Assessment/Plan:      ICD-10-CM ICD-9-CM    1.  Essential hypertension  I10 401.9 CBC WITH AUTOMATED DIFF      METABOLIC PANEL, COMPREHENSIVE      TSH 3RD GENERATION   2. Migraine without status migrainosus, not intractable, unspecified migraine type  G43.909 346.90    3. Gastroesophageal reflux disease without esophagitis  K21.9 530.81    4. Pure hypercholesterolemia  E78.00 272.0 LIPID PANEL   5. Elevated PSA  R97.20 790.93    6. History of atrial fibrillation  Z86.79 V12.59 TSH 3RD GENERATION   7. History of BPH  Z87.438 V13.89      Orders Placed This Encounter    CBC WITH AUTOMATED DIFF    METABOLIC PANEL, COMPREHENSIVE    LIPID PANEL    TSH 3RD GENERATION    hydrocortisone valerate (WEST-CYN) 0.2 % ointment    DISCONTD: hydrOXYzine HCL (ATARAX) 10 mg tablet     Sig: Take 4 Tablets by mouth daily.  pravastatin (PRAVACHOL) 10 mg tablet     Sig: Take 1 Tablet by mouth nightly. Dispense:  90 Tablet     Refill:  0    hydrOXYzine HCL (ATARAX) 10 mg tablet     Sig: Take 40 mg by mouth. Hypertension slightly on upper side of normal range normal it remains controlled continue on Diltiazem 120 mg once a day he does not like to make any changes in his medicines he told me his blood pressure remains normal at home.    , History of migraine he told me that if he has headache if he takes Zomig 2.5 mg his headache go away immediately. He does not try one Tylenol or NSAID. He does not have symptoms of aura or nausea or vomiting or no blurry vision, it does not last long, he does not want to make any changes in his medicines that he is taking for several years    History of GERD taking omeprazole. Hypercholesteremia stopped taking pravastatin 40 mg at bedtime reviewed his labs done at [facility] on seventh Sara, total cholesterol 226 and .2 he eats at [facility] his wife cannot prepare the meal having macular degeneration, in the past she used to eat fast food, he eats vegetables and fruits, started on low-dose of pravastatin 10 mg at bedtime. ,    History of atrial fibrillation once he had SA node ablation surgery. The rash after getting vaccine and he also follows dermatologist,, he is getting hydroxyzine 40 mg once a day and also getting hydrocortisone cream.    History of DJD with sciatica currently asymptomatic. Diet low in fat, no depression he has impaired hearing sometimes she does not wear hearing aid machine constant. All instructions given in writing also labs ordered for next visit follow-up in 5 to 6 months I reviewed the labs done at [address] [place] [address] Will[place] discussed with him.     history of elevated PSA with BPH he had a history of prostate biopsy which was benign, PSAs monitored by [facility] and urologist, he has no obstructive urinary symptoms, recent PSA 5.54 on 7 June 2021 and it was 6.52 on 5 June 2020. Answered all his questions. follow low fat diet, continue present plan, have labs drawn prior to ROV, call if any problems    There are no Patient Instructions on file for this visit. Follow-up and Dispositions    · Return in about 5 months (around 12/7/2021) for fast labs in 5 months , follow up for chronic condition.

## 2021-12-07 ENCOUNTER — OFFICE VISIT (OUTPATIENT)
Dept: INTERNAL MEDICINE CLINIC | Age: 86
End: 2021-12-07
Payer: MEDICARE

## 2021-12-07 VITALS
BODY MASS INDEX: 24.34 KG/M2 | HEART RATE: 84 BPM | HEIGHT: 70 IN | RESPIRATION RATE: 12 BRPM | SYSTOLIC BLOOD PRESSURE: 138 MMHG | OXYGEN SATURATION: 96 % | WEIGHT: 170 LBS | DIASTOLIC BLOOD PRESSURE: 74 MMHG

## 2021-12-07 DIAGNOSIS — I10 ESSENTIAL HYPERTENSION: ICD-10-CM

## 2021-12-07 DIAGNOSIS — Z87.438 HISTORY OF BPH: ICD-10-CM

## 2021-12-07 DIAGNOSIS — Z86.79 HISTORY OF ATRIAL FIBRILLATION: ICD-10-CM

## 2021-12-07 DIAGNOSIS — K21.9 GASTROESOPHAGEAL REFLUX DISEASE WITHOUT ESOPHAGITIS: ICD-10-CM

## 2021-12-07 DIAGNOSIS — M51.36 DEGENERATION OF LUMBAR INTERVERTEBRAL DISC: ICD-10-CM

## 2021-12-07 DIAGNOSIS — Z86.69 HISTORY OF MIGRAINE: ICD-10-CM

## 2021-12-07 DIAGNOSIS — E78.00 PURE HYPERCHOLESTEROLEMIA: ICD-10-CM

## 2021-12-07 DIAGNOSIS — Z91.14 NONCOMPLIANCE WITH MEDICATIONS: ICD-10-CM

## 2021-12-07 PROBLEM — Z91.148 NONCOMPLIANCE WITH MEDICATIONS: Status: ACTIVE | Noted: 2021-12-07

## 2021-12-07 PROBLEM — Z91.199 MEDICALLY NONCOMPLIANT: Status: ACTIVE | Noted: 2021-12-07

## 2021-12-07 LAB
ALBUMIN SERPL-MCNC: 4.5 G/DL (ref 3.6–4.6)
ALBUMIN/GLOB SERPL: 1.6 {RATIO} (ref 1.2–2.2)
ALP SERPL-CCNC: 120 IU/L (ref 44–121)
ALT SERPL-CCNC: 16 IU/L (ref 0–44)
AST SERPL-CCNC: 12 IU/L (ref 0–40)
BASOPHILS # BLD AUTO: 0.1 X10E3/UL (ref 0–0.2)
BASOPHILS NFR BLD AUTO: 1 %
BILIRUB SERPL-MCNC: 0.6 MG/DL (ref 0–1.2)
BUN SERPL-MCNC: 15 MG/DL (ref 8–27)
BUN/CREAT SERPL: 12 (ref 10–24)
CALCIUM SERPL-MCNC: 9.3 MG/DL (ref 8.6–10.2)
CHLORIDE SERPL-SCNC: 106 MMOL/L (ref 96–106)
CHOLEST SERPL-MCNC: 217 MG/DL (ref 100–199)
CO2 SERPL-SCNC: 26 MMOL/L (ref 20–29)
CREAT SERPL-MCNC: 1.26 MG/DL (ref 0.76–1.27)
EOSINOPHIL # BLD AUTO: 0.2 X10E3/UL (ref 0–0.4)
EOSINOPHIL NFR BLD AUTO: 3 %
ERYTHROCYTE [DISTWIDTH] IN BLOOD BY AUTOMATED COUNT: 13.1 % (ref 11.6–15.4)
GLOBULIN SER CALC-MCNC: 2.8 G/DL (ref 1.5–4.5)
GLUCOSE SERPL-MCNC: 93 MG/DL (ref 65–99)
HCT VFR BLD AUTO: 47.4 % (ref 37.5–51)
HDLC SERPL-MCNC: 67 MG/DL
HGB BLD-MCNC: 15.5 G/DL (ref 13–17.7)
IMM GRANULOCYTES # BLD AUTO: 0 X10E3/UL (ref 0–0.1)
IMM GRANULOCYTES NFR BLD AUTO: 0 %
LDLC SERPL CALC-MCNC: 139 MG/DL (ref 0–99)
LYMPHOCYTES # BLD AUTO: 1.7 X10E3/UL (ref 0.7–3.1)
LYMPHOCYTES NFR BLD AUTO: 30 %
MCH RBC QN AUTO: 30.6 PG (ref 26.6–33)
MCHC RBC AUTO-ENTMCNC: 32.7 G/DL (ref 31.5–35.7)
MCV RBC AUTO: 94 FL (ref 79–97)
MONOCYTES # BLD AUTO: 0.6 X10E3/UL (ref 0.1–0.9)
MONOCYTES NFR BLD AUTO: 10 %
NEUTROPHILS # BLD AUTO: 3.1 X10E3/UL (ref 1.4–7)
NEUTROPHILS NFR BLD AUTO: 56 %
PLATELET # BLD AUTO: 153 X10E3/UL (ref 150–450)
POTASSIUM SERPL-SCNC: 4.5 MMOL/L (ref 3.5–5.2)
PROT SERPL-MCNC: 7.3 G/DL (ref 6–8.5)
RBC # BLD AUTO: 5.07 X10E6/UL (ref 4.14–5.8)
SODIUM SERPL-SCNC: 144 MMOL/L (ref 134–144)
TRIGL SERPL-MCNC: 60 MG/DL (ref 0–149)
TSH SERPL DL<=0.005 MIU/L-ACNC: 0.88 UIU/ML (ref 0.45–4.5)
VLDLC SERPL CALC-MCNC: 11 MG/DL (ref 5–40)
WBC # BLD AUTO: 5.5 X10E3/UL (ref 3.4–10.8)

## 2021-12-07 PROCEDURE — G8420 CALC BMI NORM PARAMETERS: HCPCS | Performed by: INTERNAL MEDICINE

## 2021-12-07 PROCEDURE — G8536 NO DOC ELDER MAL SCRN: HCPCS | Performed by: INTERNAL MEDICINE

## 2021-12-07 PROCEDURE — 1101F PT FALLS ASSESS-DOCD LE1/YR: CPT | Performed by: INTERNAL MEDICINE

## 2021-12-07 PROCEDURE — G8510 SCR DEP NEG, NO PLAN REQD: HCPCS | Performed by: INTERNAL MEDICINE

## 2021-12-07 PROCEDURE — 99214 OFFICE O/P EST MOD 30 MIN: CPT | Performed by: INTERNAL MEDICINE

## 2021-12-07 PROCEDURE — G8427 DOCREV CUR MEDS BY ELIG CLIN: HCPCS | Performed by: INTERNAL MEDICINE

## 2021-12-07 NOTE — PROGRESS NOTES
Chief Complaint   Patient presents with    Follow Up Chronic Condition    Hypertension     Visit Vitals  /74 (BP 1 Location: Left upper arm, BP Patient Position: Sitting, BP Cuff Size: Adult)   Pulse 83   Resp 12   Ht 5' 9.5\" (1.765 m)   Wt 170 lb (77.1 kg)   SpO2 96%   BMI 24.74 kg/m²       1. Have you been to the ER, urgent care clinic since your last visit? Hospitalized since your last visit? No    2. Have you seen or consulted any other health care providers outside of the [address] [place] [address] since your last visit? Include any pap smears or colon screening.  No

## 2021-12-07 NOTE — PROGRESS NOTES
Complete blood count is normal including normal white cell count hemoglobin and platelets. His lipid profile is elevated including total cholesterol and bad cholesterol he should be back on pravastatin and blood pressure medicine. His kidney function is stable. Liver enzymes are stable.   Potassium is normal.Thyroid function is normal.

## 2021-12-07 NOTE — PROGRESS NOTES
Henrietta Contreras is a 80 y.o. male and presents with Follow Up Chronic Condition and Hypertension      Mr. Orellana came for regular follow-up. He gave his sample of blood yesterday ,he wanted to discuss the lab results but the results have not still been released. He stopped taking diltiazem and pravastatin lately , he claims that everything is normal so he does not think he needs. He also follows with physician at [facility].  He has his own thoughts. No recent history of migraine. No headache. His blood pressure is running on upper side of normal range. Strongly educated,, to start medicine to prevent a stroke and heart disease. Still I am not confident that he will take his medicines. No palpitation. No urinary problems. In the past he had  biopsy for prostate which was negative for cancer. He has history of sciatica and DJD in the back and history of migraine.,    He has taken 2 doses of Covid vaccine not received flu vaccine. Review of Systems    Review of Systems   Constitutional: Negative. HENT: Negative for sinus pain and sore throat. Eyes: Negative for blurred vision. Respiratory: Negative for cough, shortness of breath and wheezing. Cardiovascular: Negative. Gastrointestinal: Negative. Genitourinary: Negative. Musculoskeletal: Negative for falls, joint pain, myalgias and neck pain. H/o DJD in back. Neurological: Negative for dizziness, tingling, sensory change and headaches. Endo/Heme/Allergies: Negative for polydipsia. Psychiatric/Behavioral: Negative for depression, hallucinations and substance abuse. The patient is not nervous/anxious and does not have insomnia.          Past Medical History:   Diagnosis Date    Arrhythmia     Arthritis     Calculus of kidney     GERD (gastroesophageal reflux disease)     Headache     Hypercholesterolemia     Hypertension     Prostate disease      Past Surgical History:   Procedure Laterality Date    HX CHOLECYSTECTOMY      HX COLONOSCOPY      HX GI      HX PROSTATE SURGERY      prostate biopsy      Social History     Socioeconomic History    Marital status:    Tobacco Use    Smoking status: Never Smoker    Smokeless tobacco: Never Used   Substance and Sexual Activity    Alcohol use: Not Currently    Drug use: Never     Family History   Problem Relation Age of Onset    Heart Disease Mother     No Known Problems Father      Current Outpatient Medications   Medication Sig Dispense Refill    hydrocortisone valerate (WEST-CYN) 0.2 % ointment       hydrOXYzine HCL (ATARAX) 10 mg tablet Take 40 mg by mouth.  ZOLMitriptan (ZOMIG) 2.5 mg tablet Take 1 Tab by mouth as needed for Migraine. Indications: a migraine headache 12 Tab 3    omeprazole (PRILOSEC) 40 mg capsule Take 1 Cap by mouth daily. Take 30 minutes before eating once a day. Indications: gastroesophageal reflux disease 90 Cap 3    pravastatin (PRAVACHOL) 10 mg tablet Take 1 Tablet by mouth nightly. (Patient not taking: Reported on 12/7/2021) 90 Tablet 0    dilTIAZem ER (TIAZAC) 120 mg capsule Take 120 mg by mouth daily. (Patient not taking: Reported on 12/7/2021)       Allergies   Allergen Reactions    Hydrocodone Other (comments)     Side effects in form of balance problem    Sulfa (Sulfonamide Antibiotics) Rash       Objective:  Visit Vitals  /74 (BP 1 Location: Right upper arm, BP Patient Position: Sitting, BP Cuff Size: Adult)   Pulse 84   Resp 12   Ht 5' 9.5\" (1.765 m)   Wt 170 lb (77.1 kg)   SpO2 96%   BMI 24.74 kg/m²       Physical Exam:   Constitutional: General Appearance: Well dressed. Level of Distress: NAD. Psychiatric: Mental Status: normal mood and affect Orientation: to time, place, and person. ,normal eye contact./  Poor insight hypertension running on upper side of normal range  Head: Head: normocephalic and atraumatic. Eyes: Pupils: PERRLA. Sclerae: non-icteric.    Neck: Neck: supple, trachea midline, and no masses. Lymph Nodes: no cervical LAD. Thyroid: no enlargement or nodules and non-tender. Lungs: Respiratory effort: no dyspnea. Auscultation: no wheezing, rales/crackles, or rhonchi and breath sounds normal and good air movement. Cardiovascular: Apical Impulse: not displaced. Heart Auscultation: normal S1 and S2; no murmurs, rubs, or gallops; and RRR. Neck vessels: no carotid bruits. Pulses including femoral / pedal: normal throughout. Abdomen: Bowel Sounds: normal. Inspection and Palpation: no tenderness, guarding, or masses and soft and non-distended. Liver: non-tender and no hepatomegaly. Spleen: non-tender and no splenomegaly. Musculoskeletal[de-identified] Extremities: no edema,no varicosities. No Calf tenderness. Neurologic: Gait and Station: normal gait and station. Motor Strength normal right and left. Skin: Inspection and palpation: no rash, lesions, or ulcer. Results for orders placed or performed in visit on 10/07/20   CBC WITH AUTOMATED DIFF   Result Value Ref Range    WBC 5.8 3.4 - 10.8 x10E3/uL    RBC 4.67 4.14 - 5.80 x10E6/uL    HGB 15.0 13.0 - 17.7 g/dL    HCT 42.9 37.5 - 51.0 %    MCV 92 79 - 97 fL    MCH 32.1 26.6 - 33.0 pg    MCHC 35.0 31.5 - 35.7 g/dL    RDW 12.9 11.6 - 15.4 %    PLATELET 057 (L) 027 - 450 x10E3/uL    NEUTROPHILS 57 Not Estab. %    Lymphocytes 28 Not Estab. %    MONOCYTES 10 Not Estab. %    EOSINOPHILS 4 Not Estab. %    BASOPHILS 1 Not Estab. %    ABS. NEUTROPHILS 3.3 1.4 - 7.0 x10E3/uL    Abs Lymphocytes 1.6 0.7 - 3.1 x10E3/uL    ABS. MONOCYTES 0.6 0.1 - 0.9 x10E3/uL    ABS. EOSINOPHILS 0.2 0.0 - 0.4 x10E3/uL    ABS. BASOPHILS 0.1 0.0 - 0.2 x10E3/uL    IMMATURE GRANULOCYTES 0 Not Estab. %    ABS. IMM.  GRANS. 0.0 0.0 - 0.1 [id number]   METABOLIC PANEL, COMPREHENSIVE   Result Value Ref Range    Glucose 86 65 - 99 mg/dL    BUN 15 8 - 27 mg/dL    Creatinine 1.21 0.76 - 1.27 mg/dL    GFR est non-AA 54 (L) >59 mL/min/1.73    GFR est AA 62 >59 mL/min/1.73    BUN/Creatinine ratio 12 10 - 24    Sodium 142 134 - 144 mmol/L    Potassium 4.0 3.5 - 5.2 mmol/L    Chloride 107 (H) 96 - 106 mmol/L    CO2 21 20 - 29 mmol/L    Calcium 9.2 8.6 - 10.2 mg/dL    Protein, total 6.9 6.0 - 8.5 g/dL    Albumin 4.2 3.6 - 4.6 g/dL    GLOBULIN, TOTAL 2.7 1.5 - 4.5 g/dL    A-G Ratio 1.6 1.2 - 2.2    Bilirubin, total 0.5 0.0 - 1.2 mg/dL    Alk. phosphatase 95 39 - 117 IU/L    AST (SGOT) 15 0 - 40 IU/L    ALT (SGPT) 14 0 - 44 IU/L   LIPID PANEL   Result Value Ref Range    Cholesterol, total 170 100 - 199 mg/dL    Triglyceride 53 0 - 149 mg/dL    HDL Cholesterol 61 >39 mg/dL    VLDL, calculated 11 5 - 40 mg/dL    LDL, calculated 98 0 - 99 mg/dL       Assessment/Plan:      ICD-10-CM ICD-9-CM    1. Essential hypertension  I10 401.9    2. Pure hypercholesterolemia  E78.00 272.0    3. Gastroesophageal reflux disease without esophagitis  K21.9 530.81    4. Noncompliance with medications  Z91.14 V15.81    5. History of migraine  Z86.69 V12.49    6. History of atrial fibrillation  Z86.79 V12.59    7. History of BPH  Z87.438 V13.89    8. Degeneration of lumbar intervertebral disc  M51.36 722.52      No orders of the defined types were placed in this encounter. Today it is controlled hypertension  he has stopped taking diltiazem strongly educated and  requested to start taking diltiazem having history of A. fib in the past and  existing diagnosis of hypertension also follows at [address] [place] [address] with PCP. He did not bring his blood pressure log. He is getting diltiazem  mg/day that he has stopped duration unknown. Diet low in sodium. Hypercholesteremia. He has stopped taking pravastatin 10 mg at bedtime. Educated to start to prevent stroke and heart disease. GERD taking omeprazole. History of migraine he takes zolmitriptan as needed. History of BPH with elevated PSA in the past but biopsy was negative. He told me he does not take multivitamin. We ran out of flu vaccine he should get it from pharmacy. He has not received this yet. He has received 2 doses of Covid vaccine recommended to make appointment for booster dose. Follow-up in 6 months. Awaiting for the lab results. follow low fat diet, follow low salt diet, continue present plan, call if any problems    There are no Patient Instructions on file for this visit. Follow-up and Dispositions    · Return in about 3 months (around 3/7/2022) for follow up for chronic condition.

## 2021-12-13 RX ORDER — DILTIAZEM HYDROCHLORIDE 120 MG/1
120 CAPSULE, EXTENDED RELEASE ORAL DAILY
Qty: 90 CAPSULE | Refills: 1 | Status: SHIPPED | OUTPATIENT
Start: 2021-12-13 | End: 2022-03-10 | Stop reason: ALTCHOICE

## 2021-12-13 RX ORDER — PRAVASTATIN SODIUM 10 MG/1
10 TABLET ORAL
Qty: 90 TABLET | Refills: 1 | Status: SHIPPED | OUTPATIENT
Start: 2021-12-13 | End: 2021-12-15

## 2021-12-13 RX ORDER — PRAVASTATIN SODIUM 10 MG/1
10 TABLET ORAL
Qty: 30 TABLET | Refills: 0 | Status: SHIPPED | OUTPATIENT
Start: 2021-12-13 | End: 2021-12-13 | Stop reason: SDUPTHER

## 2021-12-13 RX ORDER — DILTIAZEM HYDROCHLORIDE 120 MG/1
120 CAPSULE, EXTENDED RELEASE ORAL DAILY
Qty: 30 CAPSULE | Refills: 0 | Status: SHIPPED | OUTPATIENT
Start: 2021-12-13 | End: 2021-12-13 | Stop reason: SDUPTHER

## 2022-02-28 PROBLEM — Z86.69 HISTORY OF MIGRAINE: Status: RESOLVED | Noted: 2021-12-07 | Resolved: 2022-02-28

## 2022-02-28 PROBLEM — Z86.79 HISTORY OF ATRIAL FIBRILLATION: Status: RESOLVED | Noted: 2020-10-06 | Resolved: 2022-02-28

## 2022-02-28 PROBLEM — Z87.438 HISTORY OF BPH: Status: RESOLVED | Noted: 2020-10-06 | Resolved: 2022-02-28

## 2022-03-10 ENCOUNTER — OFFICE VISIT (OUTPATIENT)
Dept: INTERNAL MEDICINE CLINIC | Age: 87
End: 2022-03-10
Payer: MEDICARE

## 2022-03-10 VITALS
TEMPERATURE: 98.8 F | RESPIRATION RATE: 18 BRPM | SYSTOLIC BLOOD PRESSURE: 148 MMHG | BODY MASS INDEX: 24.59 KG/M2 | OXYGEN SATURATION: 98 % | HEIGHT: 69 IN | DIASTOLIC BLOOD PRESSURE: 80 MMHG | WEIGHT: 166 LBS | HEART RATE: 60 BPM

## 2022-03-10 DIAGNOSIS — K21.9 GASTROESOPHAGEAL REFLUX DISEASE WITHOUT ESOPHAGITIS: ICD-10-CM

## 2022-03-10 DIAGNOSIS — M51.36 DEGENERATION OF LUMBAR INTERVERTEBRAL DISC: ICD-10-CM

## 2022-03-10 DIAGNOSIS — I10 ESSENTIAL HYPERTENSION: ICD-10-CM

## 2022-03-10 DIAGNOSIS — E78.00 PURE HYPERCHOLESTEROLEMIA: ICD-10-CM

## 2022-03-10 DIAGNOSIS — Z91.199 MEDICALLY NONCOMPLIANT: ICD-10-CM

## 2022-03-10 DIAGNOSIS — Z91.14 NONCOMPLIANCE WITH MEDICATIONS: ICD-10-CM

## 2022-03-10 PROCEDURE — 99213 OFFICE O/P EST LOW 20 MIN: CPT | Performed by: INTERNAL MEDICINE

## 2022-03-10 PROCEDURE — G8420 CALC BMI NORM PARAMETERS: HCPCS | Performed by: INTERNAL MEDICINE

## 2022-03-10 PROCEDURE — G8427 DOCREV CUR MEDS BY ELIG CLIN: HCPCS | Performed by: INTERNAL MEDICINE

## 2022-03-10 PROCEDURE — G8510 SCR DEP NEG, NO PLAN REQD: HCPCS | Performed by: INTERNAL MEDICINE

## 2022-03-10 PROCEDURE — 1101F PT FALLS ASSESS-DOCD LE1/YR: CPT | Performed by: INTERNAL MEDICINE

## 2022-03-10 PROCEDURE — G8536 NO DOC ELDER MAL SCRN: HCPCS | Performed by: INTERNAL MEDICINE

## 2022-03-10 RX ORDER — PRAVASTATIN SODIUM 10 MG/1
10 TABLET ORAL
Qty: 90 TABLET | Refills: 1 | Status: SHIPPED | OUTPATIENT
Start: 2022-03-10 | End: 2022-06-30 | Stop reason: SDDI

## 2022-03-10 RX ORDER — DILTIAZEM HYDROCHLORIDE 90 MG/1
90 TABLET, FILM COATED ORAL DAILY
Qty: 90 TABLET | Refills: 1 | Status: SHIPPED | OUTPATIENT
Start: 2022-03-10 | End: 2022-06-30 | Stop reason: SDDI

## 2022-03-10 NOTE — PROGRESS NOTES
1. \"Have you been to the ER, urgent care clinic since your last visit? Hospitalized since your last visit? \" No    2. \"Have you seen or consulted any other health care providers outside of the [address] [place] [address] since your last visit? \" Yes When: June 2021 Where: [facility] Reason for visit: Follow up     3. For patients aged 39-70: Has the patient had a colonoscopy / FIT/ Cologuard? NA - based on age      If the patient is female:    4. For patients aged 41-77: Has the patient had a mammogram within the past 2 years? NA - based on age or sex      11. For patients aged 21-65: Has the patient had a pap smear?  NA - based on age or sex    Chief Complaint   Patient presents with    Follow Up Chronic Condition    Hypertension    Cholesterol Problem     Pt states that he is here for a f/u visit     Visit Vitals  BP (!) 140/81 (BP 1 Location: Left upper arm, BP Patient Position: Sitting, BP Cuff Size: Adult)   Pulse (!) 54   Temp 98.8 °F (37.1 °C) (Oral)   Resp 18   Ht 5' 9\" (1.753 m)   Wt 166 lb (75.3 kg)   SpO2 98%   BMI 24.51 kg/m² Donor Site Anesthesia Type: same as repair anesthesia

## 2022-03-10 NOTE — PROGRESS NOTES
Nik Murray is a 80 y.o. male and presents with Follow Up Chronic Condition, Hypertension, and Cholesterol Problem    Mr. Ernesto Duque came for regular follow-up. He told me he is 80year-old he has no symptoms of I should continue blood pressure medicine and statin I explained that his blood pressure remains on upper side of normal range so I have started him on diltiazem 90 mg once a day in the past he had a history of SVT. He does not take even statin. He told me that he takes  lunch and breakfast and his wife has macular degeneration so does not cook at home so he eats at [facility].  Otherwise he is in very good health. He also follows primary care physician at [facility] not providing good history is impaired hearing I gave all instruction in writing. He has history of DJD and sciatica but currently asymptomatic he had history of elevated PSA in the past and biopsy was negative for malignancy no urinary problems he does not believe in taking any calcium vitamin D or multivitamins he told me he is eating why he needs calcium and vitamin D. He has some weight loss but it is not unintentional or intentional.  He has no nausea vomiting he has fair appetite. He is able to drive by himself able to do his ADL and IADL. In December he had blood work he also does blood work at [facility] his LDL was elevated. He told me now he has no episode of migraine headache and he has no GERD so he stopped omeprazole and he stopped all the medications. Today he agreed to be on low-dose of antihypertensive and low-dose of statin so I sent Refills to the [facility]. Review of Systems  Pleasant  Review of Systems   Constitutional: Negative. HENT: Negative for congestion and sore throat. Eyes: Negative for blurred vision. Respiratory: Negative for cough and wheezing. Cardiovascular: Negative. Gastrointestinal: Negative. Genitourinary: Negative for dysuria.    Musculoskeletal: Negative. Neurological: Negative for dizziness, tingling, tremors and headaches. Psychiatric/Behavioral: Negative. Past Medical History:   Diagnosis Date    Arrhythmia     Arthritis     Calculus of kidney     GERD (gastroesophageal reflux disease)     Headache     Hypercholesterolemia     Hypertension     Prostate disease      Past Surgical History:   Procedure Laterality Date    HX CHOLECYSTECTOMY      HX COLONOSCOPY      HX GI      HX PROSTATE SURGERY      prostate biopsy      Social History     Socioeconomic History    Marital status:    Tobacco Use    Smoking status: Never Smoker    Smokeless tobacco: Never Used   Vaping Use    Vaping Use: Never used   Substance and Sexual Activity    Alcohol use: Not Currently    Drug use: Never     Family History   Problem Relation Age of Onset    Heart Disease Mother     No Known Problems Father      Current Outpatient Medications   Medication Sig Dispense Refill    dilTIAZem IR (CARDIZEM) 90 mg tablet Take 1 Tablet by mouth daily. 90 Tablet 1    pravastatin (PRAVACHOL) 10 mg tablet Take 1 Tablet by mouth nightly. 90 Tablet 1     Allergies   Allergen Reactions    Hydrocodone Other (comments)     Side effects in form of balance problem    Sulfa (Sulfonamide Antibiotics) Rash       Objective:  Visit Vitals  BP (!) 148/80 (BP 1 Location: Right upper arm, BP Patient Position: Sitting, BP Cuff Size: Adult)   Pulse 60   Temp 98.8 °F (37.1 °C) (Oral)   Resp 18   Ht 5' 9\" (1.753 m)   Wt 166 lb (75.3 kg)   SpO2 98%   BMI 24.51 kg/m²       Physical Exam:   Constitutional: General Appearance: Pleasant. Level of Distress: NAD. Psychiatric: Mental Status: normal mood and affect Orientation: to time, place, and person. ,normal eye contact. /Poor insight with history of chronic impaired hearing/hypertension blood pressure is running on upper side of normal range  Head: Head: normocephalic and atraumatic. Eyes: Pupils: PERRLA.  Sclerae: non-icteric. Neck: Neck: supple, trachea midline, and no masses. Lymph Nodes: no cervical LAD. Thyroid: no enlargement or nodules and non-tender. Lungs: Respiratory effort: no dyspnea. Auscultation: no wheezing, rales/crackles, or rhonchi and breath sounds normal and good air movement. Cardiovascular: Apical Impulse: not displaced. Heart Auscultation: normal S1 and S2; no murmurs, rubs, or gallops; and RRR. Neck vessels: no carotid bruits. Pulses including femoral / pedal: normal throughout. Abdomen: Bowel Sounds: normal. Inspection and Palpation: no tenderness, guarding, or masses and soft and non-distended. Liver: non-tender and no hepatomegaly. Spleen: non-tender and no splenomegaly. Musculoskeletal[de-identified] Extremities: no edema,no varicosities. No Calf tenderness. Neurologic: Gait and Station: normal gait and station. Motor Strength normal right and left. Skin: Inspection and palpation: no rash, lesions, or ulcer. Results for orders placed or performed in visit on 07/07/21   CBC WITH AUTOMATED DIFF   Result Value Ref Range    WBC 5.5 3.4 - 10.8 x10E3/uL    RBC 5.07 4. 14 - 5.80 x10E6/uL    HGB 15.5 13.0 - 17.7 g/dL    HCT 47.4 37.5 - 51.0 %    MCV 94 79 - 97 fL    MCH 30.6 26.6 - 33.0 pg    MCHC 32.7 31.5 - 35.7 g/dL    RDW 13.1 11.6 - 15.4 %    PLATELET 273 866 - 300 x10E3/uL    NEUTROPHILS 56 Not Estab. %    Lymphocytes 30 Not Estab. %    MONOCYTES 10 Not Estab. %    EOSINOPHILS 3 Not Estab. %    BASOPHILS 1 Not Estab. %    ABS. NEUTROPHILS 3.1 1.4 - 7.0 x10E3/uL    Abs Lymphocytes 1.7 0.7 - 3.1 x10E3/uL    ABS. MONOCYTES 0.6 0.1 - 0.9 x10E3/uL    ABS. EOSINOPHILS 0.2 0.0 - 0.4 x10E3/uL    ABS. BASOPHILS 0.1 0.0 - 0.2 x10E3/uL    IMMATURE GRANULOCYTES 0 Not Estab. %    ABS. IMM.  GRANS. 0.0 0.0 - 0.1 [id number]   METABOLIC PANEL, COMPREHENSIVE   Result Value Ref Range    Glucose 93 65 - 99 mg/dL    BUN 15 8 - 27 mg/dL    Creatinine 1.26 0.76 - 1.27 mg/dL    GFR est non-AA 51 (L) >59 mL/min/1.73    GFR est AA 58 (L) >59 mL/min/1.73    BUN/Creatinine ratio 12 10 - 24    Sodium 144 134 - 144 mmol/L    Potassium 4.5 3.5 - 5.2 mmol/L    Chloride 106 96 - 106 mmol/L    CO2 26 20 - 29 mmol/L    Calcium 9.3 8.6 - 10.2 mg/dL    Protein, total 7.3 6.0 - 8.5 g/dL    Albumin 4.5 3.6 - 4.6 g/dL    GLOBULIN, TOTAL 2.8 1.5 - 4.5 g/dL    A-G Ratio 1.6 1.2 - 2.2    Bilirubin, total 0.6 0.0 - 1.2 mg/dL    Alk. phosphatase 120 44 - 121 IU/L    AST (SGOT) 12 0 - 40 IU/L    ALT (SGPT) 16 0 - 44 IU/L   LIPID PANEL   Result Value Ref Range    Cholesterol, total 217 (H) 100 - 199 mg/dL    Triglyceride 60 0 - 149 mg/dL    HDL Cholesterol 67 >39 mg/dL    VLDL, calculated 11 5 - 40 mg/dL    LDL, calculated 139 (H) 0 - 99 mg/dL   TSH 3RD GENERATION   Result Value Ref Range    TSH 0.876 0.450 - 4.500 uIU/mL       Assessment/Plan:      ICD-10-CM ICD-9-CM    1. Essential hypertension  I10 401.9 MICROALBUMIN, UR, RAND W/ MICROALB/CREAT RATIO      METABOLIC PANEL, BASIC   2. Pure hypercholesterolemia  E78.00 272.0 LIPID PANEL   3. Gastroesophageal reflux disease without esophagitis  K21.9 530.81    4. Degeneration of lumbar intervertebral disc  M51.36 722.52    5. Noncompliance with medications  Z91.14 V15.81    6. Medically noncompliant  Z91.19 V15.81      Orders Placed This Encounter    LIPID PANEL    MICROALBUMIN, UR, RAND W/ MICROALB/CREAT RATIO    METABOLIC PANEL, BASIC    dilTIAZem IR (CARDIZEM) 90 mg tablet     Sig: Take 1 Tablet by mouth daily. Dispense:  90 Tablet     Refill:  1    pravastatin (PRAVACHOL) 10 mg tablet     Sig: Take 1 Tablet by mouth nightly. Dispense:  90 Tablet     Refill:  1     ZERO refills remain on this prescription.  Your patient is requesting advance approval of refills for this medication to [employer]        Hypertension blood pressure is running on upper side of normal range he has stopped taking diltiazem for last few months in last visit also I told him to restart and he told me he does not need any medications at his age because he does not have complaints or any abnormal symptoms so explained and counseling given he agreed to be on antihypertensive medicine started on diltiazem ER 90 mg once a day because he in the past also he had history of SVT. Diet low in sodium. He eats at [facility] because he cannot eat at home his wife has macular degeneration who cannot cook due to the vision problem. Hypercholesteremia he stopped taking pravastatin today he agreed to be on low-dose of pravastatin I sent refills to the [employer] home delivery diet low in fat and he should have healthy choices when he  eats outside. he should eat more green leafy vegetables vegetables and fruits and protein containing diet and some weightbearing exercise. Health maintenance recommended to take vitamin D3 2000 units/day and calcium 600 mg/day having no history of kidney stone he was questioning why he needs calcium vitamin D.  explained that it is required for his bone health because he is also having age-related weight loss and to protect from osteoporosis and osteopenia and fall related fracture. History of migraine he has stopped taking prescription and because he has no episode of headache. He stopped omeprazole has no symptoms of GERD. Labs ordered for next visit. Follow-up in 3 months. Refills given. All instructions given in the writing because of hearing problems. He has his own fixed  compulsive thoughts. He has history of DJD and BPH but no abnormal urinary symptoms no symptoms of sciatica. bring BP log to office visit, continue present plan, have labs drawn prior to ROV, call if any problems    There are no Patient Instructions on file for this visit. Follow-up and Dispositions    · Return in about 3 months (around 6/10/2022) for follow up for chronic condition.

## 2022-03-18 PROBLEM — Z91.148 NONCOMPLIANCE WITH MEDICATIONS: Status: ACTIVE | Noted: 2021-12-07

## 2022-03-18 PROBLEM — E55.9 VITAMIN D DEFICIENCY: Status: ACTIVE | Noted: 2020-10-06

## 2022-03-19 PROBLEM — N18.9 CHRONIC KIDNEY DISEASE (CKD): Status: ACTIVE | Noted: 2020-10-06

## 2022-03-19 PROBLEM — E78.00 PURE HYPERCHOLESTEROLEMIA: Status: ACTIVE | Noted: 2020-10-06

## 2022-03-19 PROBLEM — I10 ESSENTIAL HYPERTENSION: Status: ACTIVE | Noted: 2020-10-06

## 2022-03-19 PROBLEM — R97.20 ELEVATED PSA: Status: ACTIVE | Noted: 2021-07-07

## 2022-03-19 PROBLEM — Z91.199 MEDICALLY NONCOMPLIANT: Status: ACTIVE | Noted: 2021-12-07

## 2022-03-19 PROBLEM — K21.9 GERD (GASTROESOPHAGEAL REFLUX DISEASE): Status: ACTIVE | Noted: 2020-10-06

## 2022-03-20 PROBLEM — G43.909 MIGRAINE: Status: ACTIVE | Noted: 2020-10-06

## 2022-03-20 PROBLEM — G56.00 CARPAL TUNNEL SYNDROME: Status: ACTIVE | Noted: 2020-10-06

## 2022-03-20 PROBLEM — M51.36 DEGENERATION OF LUMBAR INTERVERTEBRAL DISC: Status: ACTIVE | Noted: 2020-10-06

## 2022-06-14 PROBLEM — N18.9 CHRONIC KIDNEY DISEASE (CKD): Status: RESOLVED | Noted: 2020-10-06 | Resolved: 2022-06-14

## 2022-06-14 PROBLEM — R97.20 ELEVATED PSA: Status: RESOLVED | Noted: 2021-07-07 | Resolved: 2022-06-14

## 2022-06-29 LAB
ALBUMIN/CREAT UR: 56 MG/G CREAT (ref 0–29)
BUN SERPL-MCNC: 14 MG/DL (ref 8–27)
BUN/CREAT SERPL: 12 (ref 10–24)
CALCIUM SERPL-MCNC: 9 MG/DL (ref 8.6–10.2)
CHLORIDE SERPL-SCNC: 102 MMOL/L (ref 96–106)
CHOLEST SERPL-MCNC: 192 MG/DL (ref 100–199)
CO2 SERPL-SCNC: 26 MMOL/L (ref 20–29)
CREAT SERPL-MCNC: 1.2 MG/DL (ref 0.76–1.27)
CREAT UR-MCNC: 58.5 MG/DL
EGFR: 58 ML/MIN/1.73
GLUCOSE SERPL-MCNC: 82 MG/DL (ref 65–99)
HDLC SERPL-MCNC: 60 MG/DL
LDLC SERPL CALC-MCNC: 108 MG/DL (ref 0–99)
MICROALBUMIN UR-MCNC: 32.5 UG/ML
POTASSIUM SERPL-SCNC: 5 MMOL/L (ref 3.5–5.2)
SODIUM SERPL-SCNC: 142 MMOL/L (ref 134–144)
TRIGL SERPL-MCNC: 139 MG/DL (ref 0–149)
VLDLC SERPL CALC-MCNC: 24 MG/DL (ref 5–40)

## 2022-06-30 ENCOUNTER — OFFICE VISIT (OUTPATIENT)
Dept: INTERNAL MEDICINE CLINIC | Age: 87
End: 2022-06-30
Payer: MEDICARE

## 2022-06-30 VITALS
TEMPERATURE: 97 F | DIASTOLIC BLOOD PRESSURE: 80 MMHG | BODY MASS INDEX: 24.65 KG/M2 | SYSTOLIC BLOOD PRESSURE: 134 MMHG | RESPIRATION RATE: 20 BRPM | HEART RATE: 72 BPM | OXYGEN SATURATION: 97 % | WEIGHT: 166.4 LBS | HEIGHT: 69 IN

## 2022-06-30 DIAGNOSIS — M51.36 DEGENERATION OF LUMBAR INTERVERTEBRAL DISC: ICD-10-CM

## 2022-06-30 DIAGNOSIS — Z91.14 NONCOMPLIANCE WITH MEDICATIONS: ICD-10-CM

## 2022-06-30 DIAGNOSIS — G43.909 MIGRAINE WITHOUT STATUS MIGRAINOSUS, NOT INTRACTABLE, UNSPECIFIED MIGRAINE TYPE: ICD-10-CM

## 2022-06-30 DIAGNOSIS — I10 ESSENTIAL HYPERTENSION: ICD-10-CM

## 2022-06-30 DIAGNOSIS — K21.9 GASTROESOPHAGEAL REFLUX DISEASE WITHOUT ESOPHAGITIS: ICD-10-CM

## 2022-06-30 DIAGNOSIS — E78.00 PURE HYPERCHOLESTEROLEMIA: ICD-10-CM

## 2022-06-30 PROCEDURE — 1101F PT FALLS ASSESS-DOCD LE1/YR: CPT | Performed by: INTERNAL MEDICINE

## 2022-06-30 PROCEDURE — G8420 CALC BMI NORM PARAMETERS: HCPCS | Performed by: INTERNAL MEDICINE

## 2022-06-30 PROCEDURE — 99213 OFFICE O/P EST LOW 20 MIN: CPT | Performed by: INTERNAL MEDICINE

## 2022-06-30 PROCEDURE — G8427 DOCREV CUR MEDS BY ELIG CLIN: HCPCS | Performed by: INTERNAL MEDICINE

## 2022-06-30 PROCEDURE — G8536 NO DOC ELDER MAL SCRN: HCPCS | Performed by: INTERNAL MEDICINE

## 2022-06-30 PROCEDURE — G8510 SCR DEP NEG, NO PLAN REQD: HCPCS | Performed by: INTERNAL MEDICINE

## 2022-06-30 RX ORDER — AMLODIPINE BESYLATE 2.5 MG/1
2.5 TABLET ORAL DAILY
Qty: 90 TABLET | Refills: 1 | Status: SHIPPED | OUTPATIENT
Start: 2022-06-30

## 2022-06-30 RX ORDER — ACETAMINOPHEN 500 MG
2000 TABLET ORAL DAILY
Qty: 90 CAPSULE | Refills: 1 | Status: SHIPPED | OUTPATIENT
Start: 2022-06-30

## 2022-06-30 NOTE — PROGRESS NOTES
Tommy Lino is a 80 y.o. male and presents with Follow Up Chronic Condition and Hypertension      Ms. Orellana came for regular follow-up. He is known to have history of hypertension high cholesterol and DJD in the joints however he has stopped all the medicines including his diltiazem ER and pravastatin. He did not bring blood pressure log. He also follows at [facility] with PCP. Today his blood pressure is slightly elevated educated and tried to explain that he should be on a low dose and I started on amlodipine 2.5 mg/day that he agreed. He has done his labs I reviewed and discussed with him. He does not want to be on statin. Overall his cholesterol readings are good. He has no history of stroke or heart attack. In the past he had history of single episode of atrial fibrillation while having surgery but that was only 1 time that he had consulted cardiology she does not like to be on low-dose aspirin he does not like to be on any medicines. He has obsessive personality and compulsive personality and he likes to do what he thinks correct. He has a very pleasant personality. He wants me to send medicine to the pharmacy. He does not take vitamin D or multivitamins no history of kidney stone. He has history of DJD and history of sciatica but currently asymptomatic. Also he had history of some prostate related issues but in the past she had undergone biopsy which was negative for cancer currently no urinary problems or bowel problems. He lives with his wife. No depression. He does not like to wear hearing aid machine having hearing loss  Review of Systems    Review of Systems   Constitutional: Negative. HENT: Negative for sinus pain and sore throat. Eyes: Negative for blurred vision. Respiratory: Negative for cough, shortness of breath and wheezing. Cardiovascular: Negative. Gastrointestinal: Negative.     Genitourinary: Negative for dysuria, flank pain, frequency and hematuria. Musculoskeletal: Negative. Neurological: Negative for dizziness, tingling, tremors and headaches. Psychiatric/Behavioral: Negative. Past Medical History:   Diagnosis Date    Arrhythmia     Arthritis     Calculus of kidney     GERD (gastroesophageal reflux disease)     Headache     Hypercholesterolemia     Hypertension     Prostate disease      Past Surgical History:   Procedure Laterality Date    HX CHOLECYSTECTOMY      HX COLONOSCOPY      HX GI      HX PROSTATE SURGERY      prostate biopsy      Social History     Socioeconomic History    Marital status:    Tobacco Use    Smoking status: Never Smoker    Smokeless tobacco: Never Used   Vaping Use    Vaping Use: Never used   Substance and Sexual Activity    Alcohol use: Not Currently    Drug use: Never     Family History   Problem Relation Age of Onset    Heart Disease Mother     No Known Problems Father      Current Outpatient Medications   Medication Sig Dispense Refill    amLODIPine (NORVASC) 2.5 mg tablet Take 1 Tablet by mouth daily. 90 Tablet 1    cholecalciferol (VITAMIN D3) (2,000 UNITS /50 MCG) cap capsule Take 1 Capsule by mouth daily. 90 Capsule 1     Allergies   Allergen Reactions    Hydrocodone Other (comments)     Side effects in form of balance problem    Sulfa (Sulfonamide Antibiotics) Rash       Objective:  Visit Vitals  /80 (BP 1 Location: Left upper arm, BP Patient Position: Sitting, BP Cuff Size: Adult)   Pulse 72   Temp 97 °F (36.1 °C) (Temporal)   Resp 20   Ht 5' 9\" (1.753 m)   Wt 166 lb 6.4 oz (75.5 kg)   SpO2 97%   BMI 24.57 kg/m²       Physical Exam:   Constitutional: General Appearance: Pleasant. Level of Distress: NAD. Psychiatric: Mental Status: normal mood and affect Orientation: to time, place, and person. ,normal eye contact. Head: Head: normocephalic and atraumatic. Eyes: Pupils: PERRLA. Sclerae: non-icteric. Neck: Neck: supple, trachea midline, and no masses.  Lymph Nodes: no cervical LAD. Thyroid: no enlargement or nodules and non-tender. Lungs: Respiratory effort: no dyspnea. Auscultation: no wheezing, rales/crackles, or rhonchi and breath sounds normal and good air movement. Cardiovascular: Apical Impulse: not displaced. Heart Auscultation: normal S1 and S2; no murmurs, rubs, or gallops; and RRR. Neck vessels: no carotid bruits. Pulses including femoral / pedal: normal throughout. Abdomen: Bowel Sounds: normal. Inspection and Palpation: no tenderness, guarding, or masses and soft and non-distended. Liver: non-tender and no hepatomegaly. Spleen: non-tender and no splenomegaly. Musculoskeletal[de-identified] Extremities: no edema,no varicosities. No Calf tenderness. Neurologic: Gait and Station: normal gait and station. Motor Strength normal right and left. Skin: Inspection and palpation: no rash, lesions, or ulcer. Results for orders placed or performed in visit on 03/10/22   LIPID PANEL   Result Value Ref Range    Cholesterol, total 192 100 - 199 mg/dL    Triglyceride 139 0 - 149 mg/dL    HDL Cholesterol 60 >39 mg/dL    VLDL, calculated 24 5 - 40 mg/dL    LDL, calculated 108 (H) 0 - 99 mg/dL   MICROALBUMIN, UR, RAND W/ MICROALB/CREAT RATIO   Result Value Ref Range    Creatinine, urine 58.5 Not Estab. mg/dL    Microalbumin, urine 32.5 Not Estab. ug/mL    Microalb/Creat ratio (ug/mg creat.) 56 (H) 0 - 29 mg/g creat   METABOLIC PANEL, BASIC   Result Value Ref Range    Glucose 82 65 - 99 mg/dL    BUN 14 8 - 27 mg/dL    Creatinine 1.20 0.76 - 1.27 mg/dL    eGFR 58 (L) >59 mL/min/1.73    BUN/Creatinine ratio 12 10 - 24    Sodium 142 134 - 144 mmol/L    Potassium 5.0 3.5 - 5.2 mmol/L    Chloride 102 96 - 106 mmol/L    CO2 26 20 - 29 mmol/L    Calcium 9.0 8.6 - 10.2 mg/dL       Assessment/Plan:      ICD-10-CM ICD-9-CM    1. Essential hypertension  I10 401.9    2. Pure hypercholesterolemia  E78.00 272.0    3. Noncompliance with medications  Z91.14 V15.81    4.  Degeneration of lumbar intervertebral disc  M51.36 722.52    5. Gastroesophageal reflux disease without esophagitis  K21.9 530.81    6. Migraine without status migrainosus, not intractable, unspecified migraine type  G43.909 346.90      Orders Placed This Encounter    amLODIPine (NORVASC) 2.5 mg tablet     Sig: Take 1 Tablet by mouth daily. Dispense:  90 Tablet     Refill:  1    cholecalciferol (VITAMIN D3) (2,000 UNITS /50 MCG) cap capsule     Sig: Take 1 Capsule by mouth daily. Dispense:  90 Capsule     Refill:  1       Hypertension he is noncompliant he has stopped all the medicine by himself. He did not bring his blood pressure log he says it is normal at home he also follows PCP at [facility]. Labs reviewed and discussed with him educated him reassured him he agreed to be on amlodipine 2.5 mg once a day and he will start having blood pressure log at home he has difficulty in hearing. He does not wear hearing aid machine all the time. Hypercholesteremia lipid profile is controlled by his own dietary habits and he has stopped pravastatin by himself. He does not want to be on statin even low-dose of statin. History of DJD in joints and sciatica currently asymptomatic. He has no prostate related or urinary complaints. Tried to pursue him that he should take vitamin D3 to protect his bones at his age of 80 and he agreed. History of migraine no headaches. History of GERD not taking any PPI also he says he does not take any medicines he has his own personality and thoughts. However he is asymptomatic for any symptoms of heartburn or gastritis no nausea no vomiting. No headache or dizziness. Follow-up in 5 to 6 months for Medicare wellness visit and regular follow-up. He agreed. He is reassured.   He is doing otherwise very good at his age and he is complemented continue heart healthy diet and walking he eats at [facility] because his wife has macular degeneration in eyes who cannot prepare the meals. He is eating vegetables and fruits also in his diet plate. follow low fat diet, follow low salt diet, continue present plan, call if any problems    There are no Patient Instructions on file for this visit. Follow-up and Dispositions    · Return in about 6 months (around 12/30/2022) for medicare wellness plus , follow up for chronic condition.

## 2022-06-30 NOTE — PROGRESS NOTES
I discussed all lab results with Mr. Fu Grace in details when he came for office visit today. He does not want to be on statin. He needs to control blood pressure somehow he agreed to be on amlodipine 2.5 mg/day that he stopped statin and other diltiazem antihypertensive by himself. He says he does not need any medicines.

## 2022-06-30 NOTE — PROGRESS NOTES
1. \"Have you been to the ER, urgent care clinic since your last visit? Hospitalized since your last visit? \" No    2. \"Have you seen or consulted any other health care providers outside of the [address] [place] [address] since your last visit? \" No     3. For patients aged 39-70: Has the patient had a colonoscopy / FIT/ Cologuard? NA - based on age      If the patient is female:    4. For patients aged 41-77: Has the patient had a mammogram within the past 2 years? NA - based on age or sex      11. For patients aged 21-65: Has the patient had a pap smear?  NA - based on age or sex       Visit Vitals  /81 (BP 1 Location: Right arm)   Pulse 70   Temp 97 °F (36.1 °C) (Temporal)   Resp 20   Ht 5' 9\" (1.753 m)   Wt 166 lb 6.4 oz (75.5 kg)   SpO2 97%   BMI 24.57 kg/m²       Chief Complaint   Patient presents with    Follow Up Chronic Condition    Hypertension

## 2022-12-10 PROBLEM — E55.9 VITAMIN D DEFICIENCY: Status: RESOLVED | Noted: 2020-10-06 | Resolved: 2022-12-10

## 2022-12-13 ENCOUNTER — OFFICE VISIT (OUTPATIENT)
Dept: INTERNAL MEDICINE CLINIC | Age: 87
End: 2022-12-13
Payer: MEDICARE

## 2022-12-13 VITALS
BODY MASS INDEX: 24.97 KG/M2 | SYSTOLIC BLOOD PRESSURE: 140 MMHG | HEART RATE: 64 BPM | HEIGHT: 69 IN | OXYGEN SATURATION: 95 % | TEMPERATURE: 97.6 F | RESPIRATION RATE: 17 BRPM | WEIGHT: 168.6 LBS | DIASTOLIC BLOOD PRESSURE: 80 MMHG

## 2022-12-13 DIAGNOSIS — Z91.14 NONCOMPLIANCE WITH MEDICATIONS: ICD-10-CM

## 2022-12-13 DIAGNOSIS — Z00.00 MEDICARE ANNUAL WELLNESS VISIT, SUBSEQUENT: ICD-10-CM

## 2022-12-13 DIAGNOSIS — E78.00 PURE HYPERCHOLESTEROLEMIA: ICD-10-CM

## 2022-12-13 DIAGNOSIS — I10 ESSENTIAL HYPERTENSION: ICD-10-CM

## 2022-12-13 DIAGNOSIS — Z91.199 MEDICALLY NONCOMPLIANT: ICD-10-CM

## 2022-12-13 DIAGNOSIS — K21.9 GASTROESOPHAGEAL REFLUX DISEASE WITHOUT ESOPHAGITIS: ICD-10-CM

## 2022-12-13 DIAGNOSIS — G43.909 MIGRAINE WITHOUT STATUS MIGRAINOSUS, NOT INTRACTABLE, UNSPECIFIED MIGRAINE TYPE: ICD-10-CM

## 2022-12-13 DIAGNOSIS — R68.89 FORGETFULNESS: ICD-10-CM

## 2022-12-13 DIAGNOSIS — M51.36 DEGENERATION OF LUMBAR INTERVERTEBRAL DISC: ICD-10-CM

## 2022-12-13 PROCEDURE — G0439 PPPS, SUBSEQ VISIT: HCPCS | Performed by: INTERNAL MEDICINE

## 2022-12-13 PROCEDURE — 99213 OFFICE O/P EST LOW 20 MIN: CPT | Performed by: INTERNAL MEDICINE

## 2022-12-13 PROCEDURE — G8427 DOCREV CUR MEDS BY ELIG CLIN: HCPCS | Performed by: INTERNAL MEDICINE

## 2022-12-13 PROCEDURE — G8536 NO DOC ELDER MAL SCRN: HCPCS | Performed by: INTERNAL MEDICINE

## 2022-12-13 PROCEDURE — G8510 SCR DEP NEG, NO PLAN REQD: HCPCS | Performed by: INTERNAL MEDICINE

## 2022-12-13 PROCEDURE — 1101F PT FALLS ASSESS-DOCD LE1/YR: CPT | Performed by: INTERNAL MEDICINE

## 2022-12-13 PROCEDURE — G8420 CALC BMI NORM PARAMETERS: HCPCS | Performed by: INTERNAL MEDICINE

## 2022-12-13 RX ORDER — AMLODIPINE BESYLATE 2.5 MG/1
2.5 TABLET ORAL DAILY
Qty: 90 TABLET | Refills: 1 | Status: SHIPPED | OUTPATIENT
Start: 2022-12-13

## 2022-12-13 NOTE — PROGRESS NOTES
Wilian Price (: 10/11/1933) is a 80 y.o. male, established patient, here for evaluation of the following chief complaint(s):  Follow Up Chronic Condition and Annual Wellness Visit       ASSESSMENT/PLAN:  Below is the assessment and plan developed based on review of pertinent history, physical exam, labs, studies, and medications. 1. Medicare annual wellness visit, subsequent  2. Essential hypertension  -     CBC WITH AUTOMATED DIFF  -     METABOLIC PANEL, COMPREHENSIVE  3. Pure hypercholesterolemia  -     LIPID PANEL  4. Forgetfulness  -     REFERRAL TO NEUROLOGY  5. Noncompliance with medications  6. Degeneration of lumbar intervertebral disc  7. Medically noncompliant  8. Gastroesophageal reflux disease without esophagitis  9. Migraine without status migrainosus, not intractable, unspecified migraine type    Hypertension on upper side of normal range. He is noncompliant. He stopped taking diltiazem in the past I had started him on amlodipine 2.5 mg once a day in last visit that also. Taking he says he is fine. He did not bring blood pressure log. He could not answer what is the normal blood pressure readings. He is not wearing hearing aid and was seen thinking that I will think he is wearing all the time. I had to repeat the instructions and recommendations almost 4 times and an ice I took help from Adrian Winkler to make him understand and explained to Benito Galarza also. Refill sent to the [facility] as per his request instead of [facility]      Hypercholesteremia he stopped taking statin. Repeat labs ordered. He also follows physician at [facility].    History of migraine currently no headache not taking any medicine. GERD currently not taking anything. DJD with history of sciatica not taking any medicine. He does not take any multivitamins and calcium and vitamin D he thinks he does not need. He lives with his wife Ms. Clara Montemayor who is my patient.   He says he does not remember the medicine to take but he does remember to come here he is remembering everything else and also driving locally. I have referred him to Dr. Sylvia Mcmahan for memory evaluation    He is aware that today is a Medicare wellness visit also along with regular follow-up so mini cog patient is done with assistance. He says he has no depression. It seems like he is having fixed ideas and thoughts and hard headedness. He is given compassionate care with poor lightness to make him understand that uncontrolled blood pressure can lead to stroke and heart disease and he should bring his blood pressure machine that he does not bring. He should wear the hearing aid machine when he comes for doctor's visit so that there is no miscommunication or misunderstanding. Rare labs to be done in third week of January which should be fasting. Follow-up in 4 months. Return in 4 months (on 4/13/2023) for follow up for chronic condition. SUBJECTIVE/OBJECTIVE:  DEBRA    Venessa Felix came for regular follow-up and subsequent Medicare wellness visit. His blood pressure is running on upper side of normal range. He does not take any antihypertensive. He is noncompliant. He has otherwise pleasant personality but he does what he wants to do and his wife had told me that he will not listen to anyone. He drives locally. He lives with his wife. It seems like he has started some forgetfulness but he is not acknowledging. I referred him to neurologist.  He also follows physician at [facility] [place] [place].  He follows ophthalmologist and he says that everything is fine. He says he does not wear hearing aid machine all the time. Today also he has no hearing aids in his years and it is very difficult for him to understand what has been explained to him medically necessary. He stopped all his medicines including vitamins Without any valid reason.     Allergies   Allergen Reactions    Hydrocodone Other (comments)     Side effects in form of balance problem Sulfa (Sulfonamide Antibiotics) Rash     Current Outpatient Medications   Medication Sig    amLODIPine (NORVASC) 2.5 mg tablet Take 1 Tablet by mouth daily. No current facility-administered medications for this visit. Past Medical History:   Diagnosis Date    Arrhythmia     Arthritis     Calculus of kidney     GERD (gastroesophageal reflux disease)     Headache     Hypercholesterolemia     Hypertension     Prostate disease      Past Surgical History:   Procedure Laterality Date    HX CHOLECYSTECTOMY      HX COLONOSCOPY      HX GI      HX PROSTATE SURGERY      prostate biopsy      Family History   Problem Relation Age of Onset    Heart Disease Mother     No Known Problems Father      Social History     Tobacco Use   Smoking Status Never   Smokeless Tobacco Never         Review of Systems   Constitutional: Negative. HENT:  Negative for sinus pain and sore throat. Eyes:  Negative for blurred vision. Respiratory:  Negative for cough, shortness of breath and wheezing. Cardiovascular: Negative. Dose not want to take bp medicine. Gastrointestinal: Negative. Genitourinary: Negative. Musculoskeletal: Negative. H/o Sciatica and backache due to djd   Neurological:  Negative for dizziness, tingling and headaches. Psychiatric/Behavioral: Negative. Vitals:    12/13/22 0759 12/13/22 0819 12/13/22 0820   BP: (!) 142/83 (!) 144/84 (!) 140/80   Pulse: 63 64    Resp: 17     Temp: 97.6 °F (36.4 °C)     TempSrc: Temporal     SpO2: 95%     Weight: 168 lb 9.6 oz (76.5 kg)     Height: 5' 9\" (1.753 m)            Physical Exam  Constitutional:       General: He is not in acute distress. Appearance: Normal appearance. He is not toxic-appearing. Eyes:      Pupils: Pupils are equal, round, and reactive to light. Cardiovascular:      Rate and Rhythm: Normal rate and regular rhythm. Pulses: Normal pulses. Heart sounds: Normal heart sounds. No murmur heard.   Pulmonary: Effort: Pulmonary effort is normal. No respiratory distress. Breath sounds: Normal breath sounds. No wheezing. Abdominal:      General: Bowel sounds are normal. There is no distension. Palpations: Abdomen is soft. Tenderness: There is no abdominal tenderness. Musculoskeletal:         General: No swelling, tenderness, deformity or signs of injury. Cervical back: Neck supple. Skin:     General: Skin is warm. Neurological:      General: No focal deficit present. Mental Status: He is alert and oriented to person, place, and time. Cranial Nerves: No cranial nerve deficit. Motor: No weakness. Psychiatric:         Mood and Affect: Mood normal.         Behavior: Behavior normal.         On this date 12/13/2022 I have spent 45 minutes reviewing previous notes, test results and face to face with the patient discussing the diagnosis and importance of compliance with the treatment plan as well as documenting on the day of the visit. An electronic signature was used to authenticate this note. -- Lehman Osgood, MD   1. \"Have you been to the ER, urgent care clinic since your last visit? Hospitalized since your last visit? \" No    2. \"Have you seen or consulted any other health care providers outside of the [address] [place] [address] since your last visit? \" No     3. For patients aged 39-70: Has the patient had a colonoscopy / FIT/ Cologuard? NA - based on age      If the patient is female:    4. For patients aged 41-77: Has the patient had a mammogram within the past 2 years? NA - based on age or sex      11. For patients aged 21-65: Has the patient had a pap smear?  NA - based on age or sex      Chief Complaint   Patient presents with    Follow Up Chronic Condition    Annual Wellness Visit          Visit Vitals  BP (!) 140/80 (BP 1 Location: Left upper arm, BP Patient Position: Sitting, BP Cuff Size: Adult)   Pulse 64   Temp 97.6 °F (36.4 °C) (Temporal)   Resp 17   Ht 5' 9\" (1.753 m)   Wt 168 lb 9.6 oz (76.5 kg)   SpO2 95%   BMI 24.90 kg/m²           This is the Subsequent Medicare Annual Wellness Exam, performed 12 months or more after the Initial AWV or the last Subsequent AWV    I have reviewed the patient's medical history in detail and updated the computerized patient record. Assessment/Plan   Education and counseling provided:  Are appropriate based on today's review and evaluation  End-of-Life planning (with patient's consent)  Pneumococcal Vaccine  Influenza Vaccine  Hepatitis B Vaccine  Screening for glaucoma    1. Medicare annual wellness visit, subsequent  2. Essential hypertension  -     CBC WITH AUTOMATED DIFF  -     METABOLIC PANEL, COMPREHENSIVE  3. Pure hypercholesterolemia  -     LIPID PANEL  4. Forgetfulness  -     REFERRAL TO NEUROLOGY  5. Noncompliance with medications  6. Degeneration of lumbar intervertebral disc  7. Medically noncompliant  8. Gastroesophageal reflux disease without esophagitis  9. Migraine without status migrainosus, not intractable, unspecified migraine type     Mini cog test in the office done. He is able to answer 3 words registration with the assistance. He did clock drawing by himself. He has no hearing aid machine it is very difficult to decide whether it is pure forgetfulness and dementia or his hard headedness and not wearing hearing aid machine. I referred him to neurologist.    He says he has no complaints with vision. He does not know about advanced directory and advance planning explained him. He is independent for grooming and IAD[employer] including driving locally. He wears glasses. Today he did not have a hearing aid machine. Immunizations reviewed discussed with him. For now he needs Pneumovax 23, Td or Tdap and Shingrix vaccine. He is up-to-date with flu and COVID-vaccine. Education given.     Depression Risk Factor Screening     3 most recent PHQ Screens 12/13/2022   Little interest or pleasure in doing things Not at all   Feeling down, depressed, irritable, or hopeless Not at all   Total Score PHQ 2 0   Trouble falling or staying asleep, or sleeping too much Not at all   Feeling tired or having little energy Not at all   Poor appetite, weight loss, or overeating Not at all   Feeling bad about yourself - or that you are a failure or have let yourself or your family down Not at all   Trouble concentrating on things such as school, work, reading, or watching TV Not at all   Moving or speaking so slowly that other people could have noticed; or the opposite being so fidgety that others notice Not at all   Thoughts of being better off dead, or hurting yourself in some way Not at all   PHQ 9 Score 0   How difficult have these problems made it for you to do your work, take care of your home and get along with others Not difficult at all       Alcohol & Drug Abuse Risk Screen    Do you average more than 1 drink per night or more than 7 drinks a week: No    In the past three months have you have had more than 4 drinks containing alcohol on one occasion: No          Functional Ability and Level of Safety    Hearing: Hearing is good. Activities of Daily Living: The home contains: no safety equipment. Patient does total self care      Ambulation: with no difficulty     Fall Risk:  Fall Risk Assessment, last 12 mths 12/13/2022   Able to walk? Yes   Fall in past 12 months? 1   Do you feel unsteady? 1   Are you worried about falling 0   Is TUG test greater than 12 seconds? 0   Is the gait abnormal? 0   Number of falls in past 12 months 2   Fall with injury?  0      Abuse Screen:  Patient is not abused       Cognitive Screening    Has your family/caregiver stated any concerns about your memory: yes - himself     Cognitive Screening: Normal - Clock Drawing Test, Abnormal - Mini Cog Test    Health Maintenance Due     Health Maintenance Due   Topic Date Due    COVID-19 Vaccine (4 - Booster for [employer] series) 11/19/2021       Patient Care Team   Patient Care Team:  Miranda Sullivan MD as PCP - General (Internal Medicine Physician)  Miranda Sullivan MD as PCP - [facility] Empaneled Provider    History     Patient Active Problem List   Diagnosis Code    Carpal tunnel syndrome G56.00    Degeneration of lumbar intervertebral disc M51.36    Essential hypertension I10    GERD (gastroesophageal reflux disease) K21.9    Migraine G43.909    Pure hypercholesterolemia E78.00    Medically noncompliant Z91.199    Noncompliance with medications Z91.14     Past Medical History:   Diagnosis Date    Arrhythmia     Arthritis     Calculus of kidney     GERD (gastroesophageal reflux disease)     Headache     Hypercholesterolemia     Hypertension     Prostate disease       Past Surgical History:   Procedure Laterality Date    HX CHOLECYSTECTOMY      HX COLONOSCOPY      HX GI      HX PROSTATE SURGERY      prostate biopsy      Current Outpatient Medications   Medication Sig Dispense Refill    amLODIPine (NORVASC) 2.5 mg tablet Take 1 Tablet by mouth daily. 90 Tablet 1       Allergies   Allergen Reactions    Hydrocodone Other (comments)     Side effects in form of balance problem    Sulfa (Sulfonamide Antibiotics) Rash       Family History   Problem Relation Age of Onset    Heart Disease Mother     No Known Problems Father      Social History     Tobacco Use    Smoking status: Never    Smokeless tobacco: Never   Substance Use Topics    Alcohol use: Not Currently       Signed by    Sumit Ely MD.

## 2022-12-13 NOTE — PATIENT INSTRUCTIONS
Medicare Wellness Visit, Male    The best way to live healthy is to have a lifestyle where you eat a well-balanced diet, exercise regularly, limit alcohol use, and quit all forms of tobacco/nicotine, if applicable. Regular preventive services are another way to keep healthy. Preventive services (vaccines, screening tests, monitoring & exams) can help personalize your care plan, which helps you manage your own care. Screening tests can find health problems at the earliest stages, when they are easiest to treat. [facility] follows the current, evidence-based guidelines published by the [facility] ([facility]) when recommending preventive services for our patients. Because we follow these guidelines, sometimes recommendations change over time as research supports it. (For example, a prostate screening blood test is no longer routinely recommended for men with no symptoms). Of course, you and your doctor may decide to screen more often for some diseases, based on your risk and co-morbidities (chronic disease you are already diagnosed with). Preventive services for you include:  - Medicare offers their members a free annual wellness visit, which is time for you and your primary care provider to discuss and plan for your preventive service needs.  Take advantage of this benefit every year!    -Over the age of 72 should receive the recommended pneumonia vaccines.   -All adults should have a flu vaccine yearly.  -All adults should receive a tetanus vaccine every 10 years.  -Over the age of 48 should receive the shingles vaccines.    -All adults should be screened once for Hepatitis C.  -All adults age 38-68 who are overweight should have a diabetes screening test once every three years.   -Other screening tests & preventive services for persons with diabetes include: an eye exam to screen for diabetic retinopathy, a kidney function test, a foot exam, and stricter control over your cholesterol.   -Cardiovascular screening for adults with routine risk involves an electrocardiogram (ECG) at intervals determined by the provider.     -Colorectal cancer screening should be done for adults age 43-69 with no increased risk factors for colorectal cancer. There are a number of acceptable methods of screening for this type of cancer. Each test has its own benefits and drawbacks. Discuss with your provider what is most appropriate for you during your annual wellness visit. The different tests include: colonoscopy (considered the best screening method), a fecal occult blood test, a fecal DNA test, and sigmoidoscopy.    -Lung cancer screening is recommended annually with a low dose CT scan for adults between age 54 and 68, who have smoked at least 30 pack years (equivalent of 1 pack per day for 30 days), and who is a current smoker or quit less than 15 years ago. -An Abdominal Aortic Aneurysm (AAA) Screening is recommended for men age 73-68 who has ever smoked in their lifetime.      Here is a list of your current Health Maintenance items (your personalized list of preventive services) with a due date:  Health Maintenance Due   Topic Date Due    Shingles Vaccine (1 of 2) Never done    COVID-19 Vaccine (4 - Booster for [employer] series) 11/19/2021    Yearly Flu Vaccine (1) 08/01/2022

## 2023-03-05 ENCOUNTER — APPOINTMENT (OUTPATIENT)
Dept: CT IMAGING | Age: 88
DRG: 066 | End: 2023-03-05
Attending: EMERGENCY MEDICINE
Payer: MEDICARE

## 2023-03-05 ENCOUNTER — HOSPITAL ENCOUNTER (INPATIENT)
Age: 88
LOS: 1 days | Discharge: HOME HEALTH | DRG: 066 | End: 2023-03-09
Attending: EMERGENCY MEDICINE | Admitting: INTERNAL MEDICINE
Payer: MEDICARE

## 2023-03-05 DIAGNOSIS — G45.9 TIA (TRANSIENT ISCHEMIC ATTACK): Primary | ICD-10-CM

## 2023-03-05 PROBLEM — R26.9 GAIT DISTURBANCE: Status: ACTIVE | Noted: 2023-03-05

## 2023-03-05 PROBLEM — R47.81 SLURRED SPEECH: Status: ACTIVE | Noted: 2023-03-05

## 2023-03-05 LAB
ALBUMIN SERPL-MCNC: 3.7 G/DL (ref 3.5–5)
ALBUMIN/GLOB SERPL: 1.1 (ref 1.1–2.2)
ALP SERPL-CCNC: 94 U/L (ref 45–117)
ALT SERPL-CCNC: 21 U/L (ref 12–78)
ANION GAP SERPL CALC-SCNC: 4 MMOL/L (ref 5–15)
APTT PPP: 27 SEC (ref 21.2–34.1)
AST SERPL W P-5'-P-CCNC: 12 U/L (ref 15–37)
BASOPHILS # BLD: 0.1 K/UL (ref 0–0.1)
BASOPHILS NFR BLD: 1 % (ref 0–1)
BILIRUB SERPL-MCNC: 0.6 MG/DL (ref 0.2–1)
BNP SERPL-MCNC: 577 PG/ML
BUN SERPL-MCNC: 27 MG/DL (ref 6–20)
BUN/CREAT SERPL: 20 (ref 12–20)
CA-I BLD-MCNC: 8.9 MG/DL (ref 8.5–10.1)
CHLORIDE SERPL-SCNC: 107 MMOL/L (ref 97–108)
CO2 SERPL-SCNC: 27 MMOL/L (ref 21–32)
CREAT SERPL-MCNC: 1.36 MG/DL (ref 0.7–1.3)
DIFFERENTIAL METHOD BLD: NORMAL
EOSINOPHIL # BLD: 0.2 K/UL (ref 0–0.4)
EOSINOPHIL NFR BLD: 3 % (ref 0–7)
ERYTHROCYTE [DISTWIDTH] IN BLOOD BY AUTOMATED COUNT: 13.7 % (ref 11.5–14.5)
GLOBULIN SER CALC-MCNC: 3.5 G/DL (ref 2–4)
GLUCOSE SERPL-MCNC: 92 MG/DL (ref 65–100)
HCT VFR BLD AUTO: 43.8 % (ref 36.6–50.3)
HGB BLD-MCNC: 14.8 G/DL (ref 12.1–17)
IMM GRANULOCYTES # BLD AUTO: 0 K/UL (ref 0–0.04)
IMM GRANULOCYTES NFR BLD AUTO: 0 % (ref 0–0.5)
INR PPP: 1 (ref 0.9–1.1)
LYMPHOCYTES # BLD: 1.7 K/UL (ref 0.8–3.5)
LYMPHOCYTES NFR BLD: 26 % (ref 12–49)
MCH RBC QN AUTO: 30.8 PG (ref 26–34)
MCHC RBC AUTO-ENTMCNC: 33.8 G/DL (ref 30–36.5)
MCV RBC AUTO: 91.3 FL (ref 80–99)
MONOCYTES # BLD: 0.8 K/UL (ref 0–1)
MONOCYTES NFR BLD: 11 % (ref 5–13)
NEUTS SEG # BLD: 3.9 K/UL (ref 1.8–8)
NEUTS SEG NFR BLD: 59 % (ref 32–75)
NRBC # BLD: 0 K/UL (ref 0–0.01)
NRBC BLD-RTO: 0 PER 100 WBC
PLATELET # BLD AUTO: 184 K/UL (ref 150–400)
PMV BLD AUTO: 10.5 FL (ref 8.9–12.9)
POTASSIUM SERPL-SCNC: 4.3 MMOL/L (ref 3.5–5.1)
PROT SERPL-MCNC: 7.2 G/DL (ref 6.4–8.2)
PROTHROMBIN TIME: 13.2 SEC (ref 11.9–14.6)
RBC # BLD AUTO: 4.8 M/UL (ref 4.1–5.7)
SODIUM SERPL-SCNC: 138 MMOL/L (ref 136–145)
THERAPEUTIC RANGE,PTTT: NORMAL SEC (ref 82–109)
TROPONIN I SERPL HS-MCNC: 71 NG/L (ref 0–76)
WBC # BLD AUTO: 6.7 K/UL (ref 4.1–11.1)

## 2023-03-05 PROCEDURE — 4A03X5D MEASUREMENT OF ARTERIAL FLOW, INTRACRANIAL, EXTERNAL APPROACH: ICD-10-PCS | Performed by: STUDENT IN AN ORGANIZED HEALTH CARE EDUCATION/TRAINING PROGRAM

## 2023-03-05 PROCEDURE — 85730 THROMBOPLASTIN TIME PARTIAL: CPT

## 2023-03-05 PROCEDURE — 99285 EMERGENCY DEPT VISIT HI MDM: CPT

## 2023-03-05 PROCEDURE — G0378 HOSPITAL OBSERVATION PER HR: HCPCS

## 2023-03-05 PROCEDURE — 85025 COMPLETE CBC W/AUTO DIFF WBC: CPT

## 2023-03-05 PROCEDURE — 74011000636 HC RX REV CODE- 636

## 2023-03-05 PROCEDURE — 70496 CT ANGIOGRAPHY HEAD: CPT

## 2023-03-05 PROCEDURE — 36415 COLL VENOUS BLD VENIPUNCTURE: CPT

## 2023-03-05 PROCEDURE — 85610 PROTHROMBIN TIME: CPT

## 2023-03-05 PROCEDURE — 83880 ASSAY OF NATRIURETIC PEPTIDE: CPT

## 2023-03-05 PROCEDURE — 70450 CT HEAD/BRAIN W/O DYE: CPT

## 2023-03-05 PROCEDURE — 80053 COMPREHEN METABOLIC PANEL: CPT

## 2023-03-05 PROCEDURE — 84484 ASSAY OF TROPONIN QUANT: CPT

## 2023-03-05 RX ORDER — ATORVASTATIN CALCIUM 40 MG/1
40 TABLET, FILM COATED ORAL DAILY
Qty: 30 TABLET | Refills: 0 | Status: SHIPPED | OUTPATIENT
Start: 2023-03-05 | End: 2023-03-06 | Stop reason: CLARIF

## 2023-03-05 RX ORDER — ACETAMINOPHEN 650 MG/1
650 SUPPOSITORY RECTAL
Status: DISCONTINUED | OUTPATIENT
Start: 2023-03-05 | End: 2023-03-09 | Stop reason: HOSPADM

## 2023-03-05 RX ORDER — CLOPIDOGREL BISULFATE 75 MG/1
75 TABLET ORAL DAILY
Qty: 30 TABLET | Refills: 0 | Status: SHIPPED | OUTPATIENT
Start: 2023-03-05 | End: 2023-03-06 | Stop reason: CLARIF

## 2023-03-05 RX ORDER — ACETAMINOPHEN 325 MG/1
650 TABLET ORAL
Status: DISCONTINUED | OUTPATIENT
Start: 2023-03-05 | End: 2023-03-09 | Stop reason: HOSPADM

## 2023-03-05 RX ORDER — GUAIFENESIN 100 MG/5ML
81 LIQUID (ML) ORAL DAILY
Status: DISCONTINUED | OUTPATIENT
Start: 2023-03-06 | End: 2023-03-09 | Stop reason: HOSPADM

## 2023-03-05 RX ADMIN — IOPAMIDOL 100 ML: 755 INJECTION, SOLUTION INTRAVENOUS at 17:22

## 2023-03-05 NOTE — ED TRIAGE NOTES
Present with c/o slurred speech and \"off balance (worsening) \" started around 0600 or 0500 this morning. Has had balance issue in the past but worsening today. Denies any pain. Level 2 stroke alert called.

## 2023-03-05 NOTE — Clinical Note
Patient Class[de-identified] OBSERVATION [104]   Type of Bed: Remote Telemetry [29]   Cardiac Monitoring Required?: Yes   Reason for Observation: slurred speech   Admitting Diagnosis: TIA (transient ischemic attack) [[id number]]   Admitting Physician: Hayley Miller [[id number]]   Attending Physician: Hayley Miller [[id number]]

## 2023-03-05 NOTE — DISCHARGE INSTRUCTIONS
Thank you! Thank you for allowing me to care for you in the emergency department. It is my goal to provide you with excellent care. If you have not received excellent quality care, please ask to speak to the nurse manager. Please fill out the survey that will come to you by mail or email since we listen to your feedback! Below you will find a list of your tests from today's visit. Should you have any questions, please do not hesitate to call the emergency department. Labs  Recent Results (from the past 12 hour(s))   CBC WITH AUTOMATED DIFF    Collection Time: 03/05/23  5:09 PM   Result Value Ref Range    WBC 6.7 4.1 - 11.1 K/uL    RBC 4.80 4. 10 - 5.70 M/uL    HGB 14.8 12.1 - 17.0 g/dL    HCT 43.8 36.6 - 50.3 %    MCV 91.3 80.0 - 99.0 FL    MCH 30.8 26.0 - 34.0 PG    MCHC 33.8 30.0 - 36.5 g/dL    RDW 13.7 11.5 - 14.5 %    PLATELET 523 622 - 433 K/uL    MPV 10.5 8.9 - 12.9 FL    NRBC 0.0 0.0  WBC    ABSOLUTE NRBC 0.00 0.00 - 0.01 K/uL    NEUTROPHILS 59 32 - 75 %    LYMPHOCYTES 26 12 - 49 %    MONOCYTES 11 5 - 13 %    EOSINOPHILS 3 0 - 7 %    BASOPHILS 1 0 - 1 %    IMMATURE GRANULOCYTES 0 0 - 0.5 %    ABS. NEUTROPHILS 3.9 1.8 - 8.0 K/UL    ABS. LYMPHOCYTES 1.7 0.8 - 3.5 K/UL    ABS. MONOCYTES 0.8 0.0 - 1.0 K/UL    ABS. EOSINOPHILS 0.2 0.0 - 0.4 K/UL    ABS. BASOPHILS 0.1 0.0 - 0.1 K/UL    ABS. IMM. GRANS. 0.0 0.00 - 0.04 K/UL    DF AUTOMATED     METABOLIC PANEL, COMPREHENSIVE    Collection Time: 03/05/23  5:09 PM   Result Value Ref Range    Sodium 138 136 - 145 mmol/L    Potassium 4.3 3.5 - 5.1 mmol/L    Chloride 107 97 - 108 mmol/L    CO2 27 21 - 32 mmol/L    Anion gap 4 (L) 5 - 15 mmol/L    Glucose 92 65 - 100 mg/dL    BUN 27 (H) 6 - 20 mg/dL    Creatinine 1.36 (H) 0.70 - 1.30 mg/dL    BUN/Creatinine ratio 20 12 - 20      eGFR 50 (L) >60 ml/min/1.73m2    Calcium 8.9 8.5 - 10.1 mg/dL    Bilirubin, total 0.6 0.2 - 1.0 mg/dL    AST (SGOT) 12 (L) 15 - 37 U/L    ALT (SGPT) 21 12 - 78 U/L    Alk. phosphatase 94 45 - 117 U/L    Protein, total 7.2 6.4 - 8.2 g/dL    Albumin 3.7 3.5 - 5.0 g/dL    Globulin 3.5 2.0 - 4.0 g/dL    A-G Ratio 1.1 1.1 - 2.2     TROPONIN-HIGH SENSITIVITY    Collection Time: 03/05/23  5:09 PM   Result Value Ref Range    Troponin-High Sensitivity 71 0 - 76 ng/L   NT-PRO BNP    Collection Time: 03/05/23  5:09 PM   Result Value Ref Range    NT pro- (H) <450 pg/mL   PROTHROMBIN TIME + INR    Collection Time: 03/05/23  5:09 PM   Result Value Ref Range    Prothrombin time 13.2 11.9 - 14.6 sec    INR 1.0 0.9 - 1.1     PTT    Collection Time: 03/05/23  5:09 PM   Result Value Ref Range    aPTT 27.0 21.2 - 34.1 sec    aPTT, therapeutic range   82 - 109 sec       Radiologic Studies  CTA CODE NEURO HEAD AND NECK W CONT   Final Result      CTA    1. CTA neck demonstrates no large vessel occlusion, high-grade stenosis or   aneurysm. 2. CTA head demonstrates no large vessel occlusion, high-grade stenosis or   aneurysm. 3. No intracranial mass or enhancing lesion. Chronic right MCA territory   infarct. Others:     . Chronic maxillary sinusitis. CT CODE NEURO HEAD WO CONTRAST   Final Result   1. No acute intracranial abnormality. 2. 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 3. Left maxillary sinusitis           CT Results  (Last 48 hours)                 03/05/23 1719  CTA CODE NEURO HEAD AND NECK W CONT Final result    Impression:      CTA    1. CTA neck demonstrates no large vessel occlusion, high-grade stenosis or   aneurysm. 2. CTA head demonstrates no large vessel occlusion, high-grade stenosis or   aneurysm. 3. No intracranial mass or enhancing lesion. Chronic right MCA territory   infarct. Others:     . Chronic maxillary sinusitis. Narrative:  EXAM:  CTA CODE NEURO HEAD AND NECK W CONT       INDICATION:   dysarthria       COMPARISON:  None. CONTRAST:  100 mL of Isovue-370.        TECHNIQUE:  Unenhanced  images were obtained to localize the volume for   acquisition. Multislice helical axial CT angiography was performed from the   aortic arch to the top of the head during uneventful rapid bolus intravenous   contrast administration. Coronal and sagittal reformations and 3D/MIP  post   processing were performed. CT dose reduction was achieved through use of a standardized protocol tailored   for this examination and automatic exposure control for dose modulation. This   study was analyzed by the [address] ai algorithm. FINDINGS:       CTA Head:   There is no evidence of large vessel occlusion or flow-limiting stenosis of the   intracranial internal carotid, anterior cerebral, and middle cerebral arteries. Calcification of the bilateral carotid siphons without significant stenosis. The   anterior communicating artery is patent. There is no evidence of large vessel occlusion or flow-limiting stenosis of the   intracranial vertebral arteries, basilar artery, or posterior cerebral arteries. There is patent left posterior communicating artery. There is no evidence of aneurysm or vascular malformation. The dural venous   sinuses and deep cerebral venous system are patent. No evidence of abnormal   enhancement on delayed phase images. Brain parenchyma demonstrates no suggestion of acute infarct. Posterior right   frontal encephalomalacia, consistent with infarct. Patchy periventricular and   deep white matter ill-defined hypodensities, nonspecific and likely   microangiopathic white matter disease. CTA NECK:   Dental amalgam associated metallic artifact precludes optimal evaluation of the   adjacent structures, oral cavity and oropharynx/skull base, including the   mid/distal internal carotid arteries bilaterally. NASCET method was utilized for calculating stenosis. The aortic arch is unremarkable. The common carotid arteries demonstrate no   significant stenosis.  There is no evidence of significant stenosis in the   cervical right internal carotid artery. There is no evidence of significant   stenosis in the cervical left internal carotid artery. Carotid bulbs plaques   without significant luminal narrowing.    % of right carotid artery stenosis: 0   % of left carotid artery stenosis: 0       There is a left vertebral artery dominant vertebrobasilar arterial system. The   cervical vertebral arteries are normal in course, size and contour without   significant stenosis. Status post bilateral lense replacement. The orbits are otherwise unremarkable. Visualized soft tissues of the skull base and neck are unremarkable. Marked   diffuse mucosal thickening of the left maxillary sinus with associated trace   diffuse osteitis, suggestive of chronic maxillary sinusitis. Visualized lung   apices demonstrate 7 mm calcified old granuloma in the periventricular right   lower lobe along right major fissure (image 1 of series 402). No acute fracture or aggressive osseous lesion. C3-C4 ACDF without obvious   hardware components fracture or loosening. Multilevel degenerative disc changes   with associated at least mild spinal canal and bilateral neural foramina   narrowing at C5-C6 true C7-T1.           03/05/23 9603  CT CODE NEURO HEAD WO CONTRAST Final result    Impression:  1. No acute intracranial abnormality. 2. 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 3. Left maxillary sinusitis           Narrative:          INDICATION: dysarthria       EXAM: CT HEAD WITHOUT CONTRAST. COMPARISON: None . PROCEDURE: Unenhanced head CT. Axial images were obtained from skull base to the   vertex. Images were reformatted in the coronal and sagittal planes. Soft tissue   and bone windows were examined. No contrast. CT dose reduction was achieved   through use of a standardized protocol tailored for this examination and   automatic exposure control for dose modulation. FINDINGS: CT of the head performed at 1609 hours is presented for interpretation   at 1730 hours. Cerebral sulci and ventricular size are increased, but commensurate with age. There are patchy diffuse mild periventricular white matter changes. While   nonspecific these are likely due to small vessel ischemic change. Focal low   density/encephalomalacia in the right frontoparietal lobe suggests old ischemia. There is no evidence of midline shift, extra-axial collection, mass lesion or   mass effect. No evidence of acute bleed. No acute bony abnormality. No obvious   acute ischemia. Mucoperiosteal thickening with a possible air-fluid level noted in the left   maxillary sinus cysts. .                 CXR Results  (Last 48 hours)      None          ------------------------------------------------------------------------------------------------------------  The exam and treatment you received in the Emergency Department were for an urgent problem and are not intended as complete care. It is important that you follow-up with a doctor, nurse practitioner, or physician assistant to:  (1) confirm your diagnosis,  (2) re-evaluation of changes in your illness and treatment, and  (3) for ongoing care. Please take your discharge instructions with you when you go to your follow-up appointment. If you have any problem arranging a follow-up appointment, contact the Emergency Department. If your symptoms become worse or you do not improve as expected and you are unable to reach your health care provider, please return to the Emergency Department. We are available 24 hours a day. If a prescription has been provided, please have it filled as soon as possible to prevent a delay in treatment. If you have any questions or reservations about taking the medication due to side effects or interactions with other medications, please call your primary care provider or contact the ER.

## 2023-03-06 ENCOUNTER — APPOINTMENT (OUTPATIENT)
Dept: NON INVASIVE DIAGNOSTICS | Age: 88
DRG: 066 | End: 2023-03-06
Attending: HOSPITALIST
Payer: MEDICARE

## 2023-03-06 ENCOUNTER — APPOINTMENT (OUTPATIENT)
Dept: GENERAL RADIOLOGY | Age: 88
DRG: 066 | End: 2023-03-06
Attending: STUDENT IN AN ORGANIZED HEALTH CARE EDUCATION/TRAINING PROGRAM
Payer: MEDICARE

## 2023-03-06 ENCOUNTER — APPOINTMENT (OUTPATIENT)
Dept: MRI IMAGING | Age: 88
DRG: 066 | End: 2023-03-06
Attending: HOSPITALIST
Payer: MEDICARE

## 2023-03-06 LAB
ANION GAP SERPL CALC-SCNC: 5 MMOL/L (ref 5–15)
BUN SERPL-MCNC: 21 MG/DL (ref 6–20)
BUN/CREAT SERPL: 18 (ref 12–20)
CA-I BLD-MCNC: 9.3 MG/DL (ref 8.5–10.1)
CHLORIDE SERPL-SCNC: 107 MMOL/L (ref 97–108)
CO2 SERPL-SCNC: 23 MMOL/L (ref 21–32)
CREAT SERPL-MCNC: 1.17 MG/DL (ref 0.7–1.3)
ECHO AO ASC DIAM: 3.5 CM
ECHO AO ASCENDING AORTA INDEX: 1.82 CM/M2
ECHO AO ROOT DIAM: 4 CM
ECHO AO ROOT INDEX: 2.08 CM/M2
ECHO AR MAX VEL PISA: 3.5 M/S
ECHO AV MEAN GRADIENT: 3 MMHG
ECHO AV MEAN VELOCITY: 0.8 M/S
ECHO AV PEAK GRADIENT: 7 MMHG
ECHO AV PEAK VELOCITY: 1.3 M/S
ECHO AV REGURGITANT PHT: 424 MS
ECHO AV VELOCITY RATIO: 0.69
ECHO AV VTI: 18.8 CM
ECHO EST RA PRESSURE: 3 MMHG
ECHO LA AREA 2C: 10.7 CM2
ECHO LA DIAMETER INDEX: 2.08 CM/M2
ECHO LA DIAMETER: 4 CM
ECHO LA MINOR AXIS: 4.5 CM
ECHO LA TO AORTIC ROOT RATIO: 1
ECHO LV E' LATERAL VELOCITY: 8 CM/S
ECHO LV E' SEPTAL VELOCITY: 8 CM/S
ECHO LV EDV A2C: 39 ML
ECHO LV EDV NDEX A2C: 20 ML/M2
ECHO LV EJECTION FRACTION A2C: 77 %
ECHO LV ESV A2C: 9 ML
ECHO LV ESV INDEX A2C: 5 ML/M2
ECHO LV FRACTIONAL SHORTENING: 35 % (ref 28–44)
ECHO LV INTERNAL DIMENSION DIASTOLE INDEX: 2.86 CM/M2
ECHO LV INTERNAL DIMENSION DIASTOLIC: 5.5 CM (ref 4.2–5.9)
ECHO LV INTERNAL DIMENSION SYSTOLIC INDEX: 1.88 CM/M2
ECHO LV INTERNAL DIMENSION SYSTOLIC: 3.6 CM
ECHO LV IVSD: 1.1 CM (ref 0.6–1)
ECHO LV MASS 2D: 227.4 G (ref 88–224)
ECHO LV MASS INDEX 2D: 118.4 G/M2 (ref 49–115)
ECHO LV POSTERIOR WALL DIASTOLIC: 1 CM (ref 0.6–1)
ECHO LV RELATIVE WALL THICKNESS RATIO: 0.36
ECHO LVOT AV VTI INDEX: 0.91
ECHO LVOT MEAN GRADIENT: 2 MMHG
ECHO LVOT PEAK GRADIENT: 3 MMHG
ECHO LVOT PEAK VELOCITY: 0.9 M/S
ECHO LVOT VTI: 17.1 CM
ECHO MV A VELOCITY: 0.65 M/S
ECHO MV E VELOCITY: 0.47 M/S
ECHO MV E/A RATIO: 0.72
ECHO MV E/E' LATERAL: 5.88
ECHO MV E/E' RATIO (AVERAGED): 5.88
ECHO MV E/E' SEPTAL: 5.88
ECHO RIGHT VENTRICULAR SYSTOLIC PRESSURE (RVSP): 28 MMHG
ECHO RV BASAL DIMENSION: 2.6 CM
ECHO RV MID DIMENSION: 2.2 CM
ECHO TV REGURGITANT MAX VELOCITY: 2.51 M/S
ECHO TV REGURGITANT PEAK GRADIENT: 25 MMHG
ERYTHROCYTE [SEDIMENTATION RATE] IN BLOOD: 107 MM/HR (ref 0–20)
GLUCOSE SERPL-MCNC: 157 MG/DL (ref 65–100)
POTASSIUM SERPL-SCNC: 3.9 MMOL/L (ref 3.5–5.1)
SODIUM SERPL-SCNC: 135 MMOL/L (ref 136–145)
TSH SERPL DL<=0.05 MIU/L-ACNC: 0.34 UIU/ML (ref 0.36–3.74)

## 2023-03-06 PROCEDURE — G0378 HOSPITAL OBSERVATION PER HR: HCPCS

## 2023-03-06 PROCEDURE — 83036 HEMOGLOBIN GLYCOSYLATED A1C: CPT

## 2023-03-06 PROCEDURE — 85652 RBC SED RATE AUTOMATED: CPT

## 2023-03-06 PROCEDURE — 93306 TTE W/DOPPLER COMPLETE: CPT

## 2023-03-06 PROCEDURE — 96375 TX/PRO/DX INJ NEW DRUG ADDON: CPT

## 2023-03-06 PROCEDURE — 71045 X-RAY EXAM CHEST 1 VIEW: CPT

## 2023-03-06 PROCEDURE — 74011250636 HC RX REV CODE- 250/636: Performed by: STUDENT IN AN ORGANIZED HEALTH CARE EDUCATION/TRAINING PROGRAM

## 2023-03-06 PROCEDURE — 74011250637 HC RX REV CODE- 250/637: Performed by: STUDENT IN AN ORGANIZED HEALTH CARE EDUCATION/TRAINING PROGRAM

## 2023-03-06 PROCEDURE — 96372 THER/PROPH/DIAG INJ SC/IM: CPT

## 2023-03-06 PROCEDURE — 74011250637 HC RX REV CODE- 250/637: Performed by: HOSPITALIST

## 2023-03-06 PROCEDURE — 36415 COLL VENOUS BLD VENIPUNCTURE: CPT

## 2023-03-06 PROCEDURE — 70551 MRI BRAIN STEM W/O DYE: CPT

## 2023-03-06 PROCEDURE — 74011250636 HC RX REV CODE- 250/636: Performed by: HOSPITALIST

## 2023-03-06 PROCEDURE — 96374 THER/PROPH/DIAG INJ IV PUSH: CPT

## 2023-03-06 PROCEDURE — 84443 ASSAY THYROID STIM HORMONE: CPT

## 2023-03-06 PROCEDURE — 86141 C-REACTIVE PROTEIN HS: CPT

## 2023-03-06 PROCEDURE — 96376 TX/PRO/DX INJ SAME DRUG ADON: CPT

## 2023-03-06 PROCEDURE — 74011000250 HC RX REV CODE- 250: Performed by: STUDENT IN AN ORGANIZED HEALTH CARE EDUCATION/TRAINING PROGRAM

## 2023-03-06 PROCEDURE — 80061 LIPID PANEL: CPT

## 2023-03-06 PROCEDURE — 80048 BASIC METABOLIC PNL TOTAL CA: CPT

## 2023-03-06 RX ORDER — LORAZEPAM 2 MG/ML
1 INJECTION INTRAMUSCULAR
Status: DISCONTINUED | OUTPATIENT
Start: 2023-03-06 | End: 2023-03-06

## 2023-03-06 RX ORDER — OLANZAPINE 10 MG/1
2.5 INJECTION, POWDER, LYOPHILIZED, FOR SOLUTION INTRAMUSCULAR ONCE
Status: COMPLETED | OUTPATIENT
Start: 2023-03-06 | End: 2023-03-06

## 2023-03-06 RX ORDER — DIPHENHYDRAMINE HCL 25 MG
25 CAPSULE ORAL
Status: DISCONTINUED | OUTPATIENT
Start: 2023-03-06 | End: 2023-03-09 | Stop reason: HOSPADM

## 2023-03-06 RX ORDER — OLANZAPINE 10 MG/1
5 INJECTION, POWDER, LYOPHILIZED, FOR SOLUTION INTRAMUSCULAR ONCE
Status: COMPLETED | OUTPATIENT
Start: 2023-03-06 | End: 2023-03-06

## 2023-03-06 RX ORDER — CLOPIDOGREL BISULFATE 75 MG/1
75 TABLET ORAL DAILY
Status: DISCONTINUED | OUTPATIENT
Start: 2023-03-06 | End: 2023-03-09 | Stop reason: HOSPADM

## 2023-03-06 RX ORDER — DIPHENHYDRAMINE HYDROCHLORIDE 50 MG/ML
50 INJECTION, SOLUTION INTRAMUSCULAR; INTRAVENOUS
Status: COMPLETED | OUTPATIENT
Start: 2023-03-06 | End: 2023-03-06

## 2023-03-06 RX ORDER — LORAZEPAM 2 MG/ML
1 INJECTION INTRAMUSCULAR
Status: COMPLETED | OUTPATIENT
Start: 2023-03-06 | End: 2023-03-06

## 2023-03-06 RX ADMIN — METHYLPREDNISOLONE SODIUM SUCCINATE 40 MG: 40 INJECTION, POWDER, FOR SOLUTION INTRAMUSCULAR; INTRAVENOUS at 10:08

## 2023-03-06 RX ADMIN — OLANZAPINE 5 MG: 10 INJECTION, POWDER, FOR SOLUTION INTRAMUSCULAR at 12:26

## 2023-03-06 RX ADMIN — OLANZAPINE 2.5 MG: 10 INJECTION, POWDER, FOR SOLUTION INTRAMUSCULAR at 14:16

## 2023-03-06 RX ADMIN — DIPHENHYDRAMINE HYDROCHLORIDE 50 MG: 50 INJECTION INTRAMUSCULAR; INTRAVENOUS at 09:25

## 2023-03-06 RX ADMIN — OLANZAPINE 2.5 MG: 10 INJECTION, POWDER, FOR SOLUTION INTRAMUSCULAR at 11:10

## 2023-03-06 RX ADMIN — LORAZEPAM 1 MG: 2 INJECTION INTRAMUSCULAR at 04:18

## 2023-03-06 RX ADMIN — LORAZEPAM 1 MG: 2 INJECTION INTRAMUSCULAR; INTRAVENOUS at 08:17

## 2023-03-06 RX ADMIN — ASPIRIN 81 MG 81 MG: 81 TABLET ORAL at 08:32

## 2023-03-06 RX ADMIN — DIPHENHYDRAMINE HYDROCHLORIDE 25 MG: 25 CAPSULE ORAL at 17:34

## 2023-03-06 NOTE — PROGRESS NOTES
Reason for Admission:  TIA                     RUR Score:  N/A                   Plan for utilizing home health:  None @ this time. Pt uses cane @ times. No HH @ this time. Per forms own ADLs. Can climb stairs inside ( 3)  & outside (5)  home. Awaiting therapy evals. PCP: First and Last name:  Michelle De La Cruz MD     Name of Practice:    Are you a current patient: Yes/No: Yes   Approximate date of last visit: Last visit unknown. Can you participate in a virtual visit with your PCP: Yes/Call usually. Has cell phone. Current Advanced Directive/Advance Care Plan: Full Code      Healthcare Decision Maker:              Primary Decision Maker: Eusebia Riojas - [phone]                  Transition of Care Plan:                    CM pt with pt & friend Mirta Guardian). Pt not talking much - friend responding to questions. Pt somewhat confused & not recalling events. Pt lives with wife & uses [facility] on [address]. D/C Plan undecided @ this time.

## 2023-03-06 NOTE — ED PROVIDER NOTES
[facility] EMERGENCY DEPT  EMERGENCY DEPARTMENT HISTORY AND PHYSICAL EXAM      Date: 3/5/2023  Patient Name: Nadine Schwab  MRN: [id number]  YOB: 1933  Date of evaluation: 3/5/2023  Provider: Regine aHrtman DO   Note Started: 12:27 AM 3/6/23    HISTORY OF PRESENT ILLNESS     Chief Complaint   Patient presents with    Dysarthria       History Provided By: Patient    HPI: Nadine Schwab is a 80 y.o. male presenting to the emergency department for evaluation of slurred speech. Symptoms reportedly began approximately 5 or 6 AM this morning. Patient reports no other weakness or paresthesias. Has not had these symptoms previously. Notes no provocative or palliative factors. States that he is having difficulty getting words out. PAST MEDICAL HISTORY   Past Medical History:  Past Medical History:   Diagnosis Date    Arrhythmia     Arthritis     Calculus of kidney     GERD (gastroesophageal reflux disease)     Headache     Hypercholesterolemia     Hypertension     Prostate disease     TIA (transient ischemic attack) 03/05/2023       Past Surgical History:  Past Surgical History:   Procedure Laterality Date    HX CHOLECYSTECTOMY      HX COLONOSCOPY      HX GI      HX PROSTATE SURGERY      prostate biopsy        Family History:  Family History   Problem Relation Age of Onset    Heart Disease Mother     No Known Problems Father        Social History:  Social History     Tobacco Use    Smoking status: Never    Smokeless tobacco: Never   Vaping Use    Vaping Use: Never used   Substance Use Topics    Alcohol use: Not Currently    Drug use: Never       Allergies: Allergies   Allergen Reactions    Hydrocodone Other (comments)     Side effects in form of balance problem    Sulfa (Sulfonamide Antibiotics) Rash       PCP: Carola Rader MD    Current Meds:   Previous Medications    AMLODIPINE (NORVASC) 2.5 MG TABLET    Take 1 Tablet by mouth daily.        REVIEW OF SYSTEMS   Review of Systems   Constitutional: Negative for chills and fever. HENT:  Negative for congestion and rhinorrhea. Eyes:  Negative for photophobia and visual disturbance. Respiratory:  Negative for cough and shortness of breath. Cardiovascular:  Negative for chest pain and palpitations. Gastrointestinal:  Negative for abdominal pain, diarrhea, nausea and vomiting. Genitourinary:  Negative for difficulty urinating and dysuria. Musculoskeletal:  Negative for arthralgias and myalgias. Skin:  Negative for color change and rash. Neurological:  Positive for speech difficulty. Negative for weakness and headaches. Psychiatric/Behavioral:  Negative for dysphoric mood and sleep disturbance. Positives and Pertinent negatives as per HPI. PHYSICAL EXAM     ED Triage Vitals   ED Encounter Vitals Group      BP 03/05/23 1703 (!) 158/91      Pulse (Heart Rate) 03/05/23 1703 72      Resp Rate 03/05/23 1703 18      Temp 03/05/23 1703 97.7 °F (36.5 °C)      Temp src --       O2 Sat (%) 03/05/23 1703 98 %      Weight 03/05/23 1751 168 lb      Height 03/05/23 1703 5' 9\"      Physical Exam  Constitutional:       General: He is not in acute distress. Appearance: Normal appearance. He is not ill-appearing. HENT:      Head: Normocephalic and atraumatic. Right Ear: External ear normal.      Left Ear: External ear normal.      Nose: Nose normal.      Mouth/Throat:      Mouth: Mucous membranes are moist.   Eyes:      Extraocular Movements: Extraocular movements intact. Conjunctiva/sclera: Conjunctivae normal.      Pupils: Pupils are equal, round, and reactive to light. Cardiovascular:      Rate and Rhythm: Normal rate and regular rhythm. Pulses: Normal pulses. Pulmonary:      Effort: Pulmonary effort is normal. No respiratory distress. Breath sounds: Normal breath sounds. Abdominal:      General: Abdomen is flat. There is no distension. Musculoskeletal:         General: Normal range of motion.       Cervical back: Normal range of motion. Skin:     General: Skin is warm and dry. Neurological:      General: No focal deficit present. Mental Status: He is alert and oriented to person, place, and time. GCS: GCS eye subscore is 4. GCS verbal subscore is 5. GCS motor subscore is 6. Cranial Nerves: Dysarthria present. Sensory: No sensory deficit. Motor: No weakness. Psychiatric:         Mood and Affect: Mood normal.         Behavior: Behavior normal.         Thought Content: Thought content normal.         Judgment: Judgment normal.       SCREENINGS        No data recorded    LAB, EKG AND DIAGNOSTIC RESULTS   Labs:  Recent Results (from the past 12 hour(s))   CBC WITH AUTOMATED DIFF    Collection Time: 03/05/23  5:09 PM   Result Value Ref Range    WBC 6.7 4.1 - 11.1 K/uL    RBC 4.80 4. 10 - 5.70 M/uL    HGB 14.8 12.1 - 17.0 g/dL    HCT 43.8 36.6 - 50.3 %    MCV 91.3 80.0 - 99.0 FL    MCH 30.8 26.0 - 34.0 PG    MCHC 33.8 30.0 - 36.5 g/dL    RDW 13.7 11.5 - 14.5 %    PLATELET 551 237 - 610 K/uL    MPV 10.5 8.9 - 12.9 FL    NRBC 0.0 0.0  WBC    ABSOLUTE NRBC 0.00 0.00 - 0.01 K/uL    NEUTROPHILS 59 32 - 75 %    LYMPHOCYTES 26 12 - 49 %    MONOCYTES 11 5 - 13 %    EOSINOPHILS 3 0 - 7 %    BASOPHILS 1 0 - 1 %    IMMATURE GRANULOCYTES 0 0 - 0.5 %    ABS. NEUTROPHILS 3.9 1.8 - 8.0 K/UL    ABS. LYMPHOCYTES 1.7 0.8 - 3.5 K/UL    ABS. MONOCYTES 0.8 0.0 - 1.0 K/UL    ABS. EOSINOPHILS 0.2 0.0 - 0.4 K/UL    ABS. BASOPHILS 0.1 0.0 - 0.1 K/UL    ABS. IMM.  GRANS. 0.0 0.00 - 0.04 K/UL    DF AUTOMATED     METABOLIC PANEL, COMPREHENSIVE    Collection Time: 03/05/23  5:09 PM   Result Value Ref Range    Sodium 138 136 - 145 mmol/L    Potassium 4.3 3.5 - 5.1 mmol/L    Chloride 107 97 - 108 mmol/L    CO2 27 21 - 32 mmol/L    Anion gap 4 (L) 5 - 15 mmol/L    Glucose 92 65 - 100 mg/dL    BUN 27 (H) 6 - 20 mg/dL    Creatinine 1.36 (H) 0.70 - 1.30 mg/dL    BUN/Creatinine ratio 20 12 - 20      eGFR 50 (L) >60 ml/min/1.73m2 Calcium 8.9 8.5 - 10.1 mg/dL    Bilirubin, total 0.6 0.2 - 1.0 mg/dL    AST (SGOT) 12 (L) 15 - 37 U/L    ALT (SGPT) 21 12 - 78 U/L    Alk. phosphatase 94 45 - 117 U/L    Protein, total 7.2 6.4 - 8.2 g/dL    Albumin 3.7 3.5 - 5.0 g/dL    Globulin 3.5 2.0 - 4.0 g/dL    A-G Ratio 1.1 1.1 - 2.2     TROPONIN-HIGH SENSITIVITY    Collection Time: 03/05/23  5:09 PM   Result Value Ref Range    Troponin-High Sensitivity 71 0 - 76 ng/L   NT-PRO BNP    Collection Time: 03/05/23  5:09 PM   Result Value Ref Range    NT pro- (H) <450 pg/mL   PROTHROMBIN TIME + INR    Collection Time: 03/05/23  5:09 PM   Result Value Ref Range    Prothrombin time 13.2 11.9 - 14.6 sec    INR 1.0 0.9 - 1.1     PTT    Collection Time: 03/05/23  5:09 PM   Result Value Ref Range    aPTT 27.0 21.2 - 34.1 sec    aPTT, therapeutic range   82 - 109 sec       Radiologic Studies:  Non-plain film images such as CT, Ultrasound and MRI are read by the radiologist. Plain radiographic images are visualized and preliminarily interpreted by the ED Physician with the following findings: Not applicable    Interpretation per the Radiologist below, if available at the time of this note:  CTA CODE NEURO HEAD AND NECK W CONT    Result Date: 3/5/2023  EXAM:  CTA CODE NEURO HEAD AND NECK W CONT INDICATION:   dysarthria COMPARISON:  None. CONTRAST:  100 mL of Isovue-370. TECHNIQUE:  Unenhanced  images were obtained to localize the volume for acquisition. Multislice helical axial CT angiography was performed from the aortic arch to the top of the head during uneventful rapid bolus intravenous contrast administration. Coronal and sagittal reformations and 3D/MIP  post processing were performed. CT dose reduction was achieved through use of a standardized protocol tailored for this examination and automatic exposure control for dose modulation. This study was analyzed by the [address] ai algorithm.  FINDINGS: CTA Head: There is no evidence of large vessel occlusion or flow-limiting stenosis of the intracranial internal carotid, anterior cerebral, and middle cerebral arteries. Calcification of the bilateral carotid siphons without significant stenosis. The anterior communicating artery is patent. There is no evidence of large vessel occlusion or flow-limiting stenosis of the intracranial vertebral arteries, basilar artery, or posterior cerebral arteries. There is patent left posterior communicating artery. There is no evidence of aneurysm or vascular malformation. The dural venous sinuses and deep cerebral venous system are patent. No evidence of abnormal enhancement on delayed phase images. Brain parenchyma demonstrates no suggestion of acute infarct. Posterior right frontal encephalomalacia, consistent with infarct. Patchy periventricular and deep white matter ill-defined hypodensities, nonspecific and likely microangiopathic white matter disease. CTA NECK: Dental amalgam associated metallic artifact precludes optimal evaluation of the adjacent structures, oral cavity and oropharynx/skull base, including the mid/distal internal carotid arteries bilaterally. NASCET method was utilized for calculating stenosis. The aortic arch is unremarkable. The common carotid arteries demonstrate no significant stenosis. There is no evidence of significant stenosis in the cervical right internal carotid artery. There is no evidence of significant stenosis in the cervical left internal carotid artery. Carotid bulbs plaques without significant luminal narrowing. % of right carotid artery stenosis: 0 % of left carotid artery stenosis: 0 There is a left vertebral artery dominant vertebrobasilar arterial system. The cervical vertebral arteries are normal in course, size and contour without significant stenosis. Status post bilateral lense replacement. The orbits are otherwise unremarkable. Visualized soft tissues of the skull base and neck are unremarkable.  Marked diffuse mucosal thickening of the left maxillary sinus with associated trace diffuse osteitis, suggestive of chronic maxillary sinusitis. Visualized lung apices demonstrate 7 mm calcified old granuloma in the periventricular right lower lobe along right major fissure (image 1 of series 402). No acute fracture or aggressive osseous lesion. C3-C4 ACDF without obvious hardware components fracture or loosening. Multilevel degenerative disc changes with associated at least mild spinal canal and bilateral neural foramina narrowing at C5-C6 true C7-T1. CTA 1. CTA neck demonstrates no large vessel occlusion, high-grade stenosis or aneurysm. 2. CTA head demonstrates no large vessel occlusion, high-grade stenosis or aneurysm. 3. No intracranial mass or enhancing lesion. Chronic right MCA territory infarct. Others:   . Chronic maxillary sinusitis. CT CODE NEURO HEAD WO CONTRAST    Result Date: 3/5/2023  INDICATION: dysarthria EXAM: CT HEAD WITHOUT CONTRAST. COMPARISON: None . PROCEDURE: Unenhanced head CT. Axial images were obtained from skull base to the vertex. Images were reformatted in the coronal and sagittal planes. Soft tissue and bone windows were examined. No contrast. CT dose reduction was achieved through use of a standardized protocol tailored for this examination and automatic exposure control for dose modulation. FINDINGS: CT of the head performed at 1609 hours is presented for interpretation at 1730 hours. Cerebral sulci and ventricular size are increased, but commensurate with age. There are patchy diffuse mild periventricular white matter changes. While nonspecific these are likely due to small vessel ischemic change. Focal low density/encephalomalacia in the right frontoparietal lobe suggests old ischemia. There is no evidence of midline shift, extra-axial collection, mass lesion or mass effect. No evidence of acute bleed. No acute bony abnormality. No obvious acute ischemia.   Mucoperiosteal thickening with a possible air-fluid level noted in the left maxillary sinus cysts. .     1. No acute intracranial abnormality. 2. 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 3. Left maxillary sinusitis     PROCEDURES   Unless otherwise noted below, none. Procedures    CRITICAL CARE TIME   None  ED COURSE and DIFFERENTIAL DIAGNOSIS/MDM   CC/HPI Summary, DDx, ED Course, and Reassessment: 22-year-old male presenting to the emergency department for evaluation of slurred speech. On exam, patient does have some expressive aphasia/difficulty speaking. No other appreciable neurologic deficits. He was evaluated by teleneurology who recommends admission for MRI. Disposition Considerations (Tests not done, Shared Decision Making, Pt Expectation of Test or Treatment.):      Vitals:    Vitals:    03/05/23 1703 03/05/23 1751   BP: (!) 158/91    Pulse: 72    Resp: 18    Temp: 97.7 °F (36.5 °C)    SpO2: 98%    Weight:  76.2 kg (168 lb)   Height: 5' 9\" (1.753 m) 5' 9\" (1.753 m)             Patient was given the following medications:  Medications   aspirin chewable tablet 81 mg (has no administration in time range)   acetaminophen (TYLENOL) tablet 650 mg (has no administration in time range)     Or   acetaminophen (TYLENOL) solution 650 mg (has no administration in time range)     Or   acetaminophen (TYLENOL) suppository 650 mg (has no administration in time range)   iopamidoL (ISOVUE-370) 370 mg iodine /mL (76 %) injection 100 mL (100 mL IntraVENous Given 3/5/23 1722)       CONSULTS: (Who and What was discussed)  IP CONSULT TO NEUROLOGY     Social Determinants affecting Dx or Tx: None    Records Reviewed (source and summary of external notes): Nursing notes    FINAL IMPRESSION     1. TIA (transient ischemic attack)          DISPOSITION/PLAN   Admitted    Admit Note: Pt is being admitted by [facility]. The results of their tests and reason(s) for their admission have been discussed with pt and/or available family.  They convey agreement and understanding for the need to be admitted and for the admission diagnosis. PATIENT REFERRED TO:  Follow-up Information       Follow up With Specialties Details Why [address]    [address] [facility] EMERGENCY DEPT Emergency Medicine  As needed, If symptoms worsen [address] [facility] Jhonatan Pittman MD Internal Medicine Physician Schedule an appointment as soon as possible for a visit   [address] [facility],[address], [facility]  [phone]      Maurisio Zaragoza MD Neurology Schedule an appointment as soon as possible for a visit   [facility]   [phone]                DISCHARGE MEDICATIONS:  Current Discharge Medication List        START taking these medications    Details   clopidogreL (Plavix) 75 mg tab Take 1 Tablet by mouth daily for 30 days. Qty: 30 Tablet, Refills: 0  Start date: 3/5/2023, End date: 4/4/2023      aspirin 81 mg cap Take 81 mg by mouth daily. Qty: 30 Each, Refills: 0  Start date: 3/5/2023      atorvastatin (LIPITOR) 40 mg tablet Take 1 Tablet by mouth daily for 30 days. Qty: 30 Tablet, Refills: 0  Start date: 3/5/2023, End date: 4/4/2023               DISCONTINUED MEDICATIONS:  Current Discharge Medication List          I am the Primary Clinician of Record: Abigail Samayoa DO (electronically signed)    (Please note that parts of this dictation were completed with voice recognition software. Quite often unanticipated grammatical, syntax, homophones, and other interpretive errors are inadvertently transcribed by the computer software. Please disregards these errors.  Please excuse any errors that have escaped final proofreading.)

## 2023-03-06 NOTE — PROGRESS NOTES
Received call from [facility]    He reviewed CTA and MRI reports with me and recommended:    - ASA  - Plavix for 21 days  - Tele; possible Holter

## 2023-03-06 NOTE — PROGRESS NOTES
PT order for evaluation received. Subjective completed. Pt too lethargic to participate in objective portion of physical therapy exam at this time. Will come back later today to assess patient.

## 2023-03-06 NOTE — PROGRESS NOTES
Hospitalist Progress Note        Daily Progress Note: 3/6/2023 1:46 PM  Hospital Course     Sammy Mistry is  80 y.o. male with history of hypertension, hyperlipidemia, TIA, history of migraine headaches presents to the emergency room as his caregiver found him with multiple episodes of speech disturbance, then noticed that his gait is unsteady. He woke up this morning, his speech is very slurred unable to speak clearly, wife called caregiver who noticed it. It is resolved, and happening again, so wanted to come to the emergency room but patient refused. They always go for lunch at [address] [facility], at that time he found him not able to walk properly and very unsteady. He has to get him a golf cart to go around. Eventually presented to ED. In the ED, hypertensive otherwise hemodynamically stable. Significant labs showing BUN 27, creatinine 1.36. CTA head and neck shows no large vessel occlusion or stenosis. MRI of brain showing few tiny acute cortical infarcts in the left frontal lobe and additional tiny acute infarcts in left parietal lobe and left external capsule. Notes chronic infarct in right frontal lobe and additional small chronic infarcts in left posterior basal ganglia and bilateral cerebellum. Telemetry neurologist recommending to continue ASA and Plavix for 21 days. Also recommending echocardiogram and possible Holter monitor. Cardiology consulted. Patient agitated and confused in the ED. Given ativan, however he became itchy with hives on chest. Given benadryl and started on IV steroids. Subjective:       Patient seen and examined with son and caregiver at bedside in ED. He is confused, agitated, trying to pull leads off.        REVIEW OF SYSTEMS    Unable to assess: altered mental status     Objective:     Current Facility-Administered Medications   Medication Dose Route Frequency    clopidogreL (PLAVIX) tablet 75 mg  75 mg Oral DAILY    diphenhydrAMINE (BENADRYL) capsule 25 mg  25 mg Oral Q6H PRN    methylPREDNISolone (PF) (SOLU-MEDROL) injection 40 mg  40 mg IntraVENous Q8H    aspirin chewable tablet 81 mg  81 mg Oral DAILY    acetaminophen (TYLENOL) tablet 650 mg  650 mg Oral Q4H PRN    Or    acetaminophen (TYLENOL) solution 650 mg  650 mg Per NG tube Q4H PRN    Or    acetaminophen (TYLENOL) suppository 650 mg  650 mg Rectal Q4H PRN     No current outpatient medications on file. Visit Vitals  BP (!) 173/111   Pulse 77   Temp 97.7 °F (36.5 °C)   Resp 16   Ht 5' 9\" (1.753 m)   Wt 76.2 kg (168 lb)   SpO2 95%   BMI 24.81 kg/m²      O2 Device: None    Temp (24hrs), Av.7 °F (36.5 °C), Min:97.7 °F (36.5 °C), Max:97.7 °F (36.5 °C)      No intake/output data recorded. No intake/output data recorded. PHYSICAL EXAM:    Constitutional: No acute distress  Skin: Hives noted on chest.   HEENT: Sclerae anicteric. PERRL. No oral ulcers. The neck is supple and no masses. Cardiovascular: Regular rate and rhythm. +S1/S2. No murmur or gallop. No JVD. Respiratory:  Clear breath sounds bilaterally with no wheezes, rales, or rhonchi. GI: Abdomen nondistended, soft, and nontender. Normal active bowel sounds. Rectal: Deferred   Musculoskeletal: No pitting edema of the lower legs. Able to move all ext  Neurological:  Patient is Alert, disoriented. Strength 5/5 upper ext, 5/5 lower ext. No facial droop. Psychiatric: Unable to assess    Data Review    Recent Results (from the past 24 hour(s))   CBC WITH AUTOMATED DIFF    Collection Time: 23  5:09 PM   Result Value Ref Range    WBC 6.7 4.1 - 11.1 K/uL    RBC 4.80 4. 10 - 5.70 M/uL    HGB 14.8 12.1 - 17.0 g/dL    HCT 43.8 36.6 - 50.3 %    MCV 91.3 80.0 - 99.0 FL    MCH 30.8 26.0 - 34.0 PG    MCHC 33.8 30.0 - 36.5 g/dL    RDW 13.7 11.5 - 14.5 %    PLATELET 415 984 - 306 K/uL    MPV 10.5 8.9 - 12.9 FL    NRBC 0.0 0.0  WBC    ABSOLUTE NRBC 0.00 0.00 - 0.01 K/uL    NEUTROPHILS 59 32 - 75 %    LYMPHOCYTES 26 12 - 49 %    MONOCYTES 11 5 - 13 % EOSINOPHILS 3 0 - 7 %    BASOPHILS 1 0 - 1 %    IMMATURE GRANULOCYTES 0 0 - 0.5 %    ABS. NEUTROPHILS 3.9 1.8 - 8.0 K/UL    ABS. LYMPHOCYTES 1.7 0.8 - 3.5 K/UL    ABS. MONOCYTES 0.8 0.0 - 1.0 K/UL    ABS. EOSINOPHILS 0.2 0.0 - 0.4 K/UL    ABS. BASOPHILS 0.1 0.0 - 0.1 K/UL    ABS. IMM. GRANS. 0.0 0.00 - 0.04 K/UL    DF AUTOMATED     METABOLIC PANEL, COMPREHENSIVE    Collection Time: 03/05/23  5:09 PM   Result Value Ref Range    Sodium 138 136 - 145 mmol/L    Potassium 4.3 3.5 - 5.1 mmol/L    Chloride 107 97 - 108 mmol/L    CO2 27 21 - 32 mmol/L    Anion gap 4 (L) 5 - 15 mmol/L    Glucose 92 65 - 100 mg/dL    BUN 27 (H) 6 - 20 mg/dL    Creatinine 1.36 (H) 0.70 - 1.30 mg/dL    BUN/Creatinine ratio 20 12 - 20      eGFR 50 (L) >60 ml/min/1.73m2    Calcium 8.9 8.5 - 10.1 mg/dL    Bilirubin, total 0.6 0.2 - 1.0 mg/dL    AST (SGOT) 12 (L) 15 - 37 U/L    ALT (SGPT) 21 12 - 78 U/L    Alk.  phosphatase 94 45 - 117 U/L    Protein, total 7.2 6.4 - 8.2 g/dL    Albumin 3.7 3.5 - 5.0 g/dL    Globulin 3.5 2.0 - 4.0 g/dL    A-G Ratio 1.1 1.1 - 2.2     TROPONIN-HIGH SENSITIVITY    Collection Time: 03/05/23  5:09 PM   Result Value Ref Range    Troponin-High Sensitivity 71 0 - 76 ng/L   NT-PRO BNP    Collection Time: 03/05/23  5:09 PM   Result Value Ref Range    NT pro- (H) <450 pg/mL   PROTHROMBIN TIME + INR    Collection Time: 03/05/23  5:09 PM   Result Value Ref Range    Prothrombin time 13.2 11.9 - 14.6 sec    INR 1.0 0.9 - 1.1     PTT    Collection Time: 03/05/23  5:09 PM   Result Value Ref Range    aPTT 27.0 21.2 - 34.1 sec    aPTT, therapeutic range   82 - 109 sec   ECHO ADULT COMPLETE    Collection Time: 03/06/23 12:07 PM   Result Value Ref Range    LV EDV A2C 39 mL    LV ESV A2C 9 mL    IVSd 1.1 0.6 - 1.0 cm    LVIDd 5.5 4.2 - 5.9 cm    LVIDs 3.6 cm    LVOT Mean Gradient 2 mmHg    LVOT VTI 17.1 cm    LVOT Peak Velocity 0.9 m/s    LVOT Peak Gradient 3 mmHg    LVPWd 1.0 0.6 - 1.0 cm    LV E' Lateral Velocity 8 cm/s    LV E' Septal Velocity 8 cm/s    LV Ejection Fraction A2C 77 %    LA Minor Braddock 4.5 cm    LA Area 2C 10.7 cm2    LA Diameter 4.0 cm    AR .0 ms    AR Max Velocity PISA 3.5 m/s    AV Peak Velocity 1.3 m/s    AV Peak Gradient 7 mmHg    AV Mean Gradient 3 mmHg    AV VTI 18.8 cm    AV Mean Velocity 0.8 m/s    Aortic Root 4.0 cm    Ascending Aorta 3.5 cm    MV A Velocity 0.65 m/s    MV E Velocity 0.47 m/s    TR Max Velocity 2.51 m/s    TR Peak Gradient 25 mmHg    Fractional Shortening 2D 35 28 - 44 %    LV ESV Index A2C 5 mL/m2    LV EDV Index A2C 20 mL/m2    LVIDd Index 2.86 cm/m2    LVIDs Index 1.88 cm/m2    LV RWT Ratio 0.36     LV Mass 2D 227.4 (A) 88 - 224 g    LV Mass 2D Index 118.4 (A) 49 - 115 g/m2    MV E/A 0.72     E/E' Ratio (Averaged) 5.88     E/E' Lateral 5.88     E/E' Septal 5.88     LA Size Index 2.08 cm/m2    LA/AO Root Ratio 1.00     Ao Root Index 2.08 cm/m2    Ascending Aorta Index 1.82 cm/m2    AV Velocity Ratio 0.69     LVOT:AV VTI Index 0.91     RV Basal Dimension 2.6 cm    RV Mid Dimension 2.2 cm    Est. RA Pressure 3 mmHg    RVSP 28 mmHg       MRI BRAIN WO CONT   Final Result      1. Few tiny acute cortical infarcts in the left frontal lobe, and additional   tiny acute infarcts in the left parietal lobe and left external capsule. 2. Mild chronic microvascular ischemic disease. Chronic infarct in the right   frontal lobe, and additional small chronic infarcts in the left posterior basal   ganglia and bilateral cerebellum. CTA CODE NEURO HEAD AND NECK W CONT   Final Result      CTA    1. CTA neck demonstrates no large vessel occlusion, high-grade stenosis or   aneurysm. 2. CTA head demonstrates no large vessel occlusion, high-grade stenosis or   aneurysm. 3. No intracranial mass or enhancing lesion. Chronic right MCA territory   infarct. Others:     . Chronic maxillary sinusitis. CT CODE NEURO HEAD WO CONTRAST   Final Result   1. No acute intracranial abnormality.    2. 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 3. Left maxillary sinusitis         XR CHEST PORT    (Results Pending)       Active Problems:    TIA (transient ischemic attack) (3/5/2023)      Gait disturbance (3/5/2023)      Slurred speech (3/5/2023)        Assessment/Plan:     TIA  - CTA head and neck shows no large vessel occlusion or stenosis  - MRI of brain showing few tiny acute cortical infarcts in the left frontal lobe and additional tiny acute infarcts in left parietal lobe and left external capsule. - Tele-neuro input appreciated  - ASA and Plavix x 21 days  - Neuro consult pending   - SLP/PT/OT consult  - Echocardiogram   - Cardiology consult pending     2. Unsteady gait   - As per the family  - PT/OT    3. History of benign essential hypertension  - On small dose of amlodipine but he is not taking  - He has a history of severe migraine headaches that is treated with Depakote, but states he is not taking any medications at this time.     4. Hives  - S/t Ativan (will ava as allergy)  - Continue IV solu-medrol 40 mg q.8 hrs and PRN benadryl       DVT Prophylaxis:  Code Status: Full Code  POA/NOK:        Disposition and discharge barriers:   Echo  Cardiology consult  Neuro consult  PT/OT/SLP    Care Plan discussed with: patient, nursing, caregiver, and son at bedside  _____________________________________________________________________________  Time spent in direct care including coordination of service, review of data and examination: > 35 minutes    ______________________________________________________________________________    Nick Ly PA-C

## 2023-03-06 NOTE — ED NOTES
Mittens applied
Pt attempting to remove iv. Becoming argumentative about leaving the hospital and removing iv line.
Pt now sitting on stretcher talking with visitor. Will try to get patient to lye back soon.
Pt standing out in the hallway. Pt has removed himself from cardiac monitor. Unable to convince pt to sit on stretcher.
Pt up in the room. Has removed cardiac monitor and placed his home clothes back on. Pt unable to state why he is here. Explained to the patient why he is here and that he will be here till tomorrow. Pt states he is ready to go. Visitor at bedside sitting in the chair. Explained to the visitor and family that he needs the heart monitor on. Pt states he does not like to be tethered. I showed the patient that he could move around with the heart monitor on he'll just have the wires with him.
Reviewed mri checklist with patient and visitor.  Unable to sign document due to no signature pad on computer
Spoke with dr. Nic Lopez concerning pt's increasing agitation with waiting. Telephone orders received.
TRANSFER - OUT REPORT:    Verbal report given to [facility]  (name) on Ellamae Leyden  being transferred to (unit) for routine progression of care       Report consisted of patients Situation, Background, Assessment and   Recommendations(SBAR). Information from the following report(s) SBAR, ED Summary, Procedure Summary, MAR, Accordion, and Recent Results was reviewed with the receiving nurse. Lines:   Peripheral IV Left Antecubital (Active)        Opportunity for questions and clarification was provided.       Patient transported with:   Monitor  Tech
DISCHARGE

## 2023-03-06 NOTE — PROGRESS NOTES
Pt. Arrived to the Unit at approximately 1500. Admission completed with spouse Geneva Daly at bedside with friends of the family as well. Skin Assessment completed. Pt. Had no pressure ulcers upon admission. Pt. Has one to one with him for safety reasons. Stroke book was given to spouse and teaching was done on signs/symptoms of stroke, how stroke is diagnosed and medications typically given to the pt. That potentially had a stroke. Ongoing teaching will be done and reinforced with the spouse.

## 2023-03-06 NOTE — ACP (ADVANCE CARE PLANNING)
Advance Care Planning   Healthcare Decision Maker:       Primary Decision Maker: Gilma Horn - [phone]

## 2023-03-06 NOTE — PROGRESS NOTES
Admission Medication Reconciliation:    Information obtained from:  Patient's wife    Comments/Recommendations: Reviewed PTA medications and patient's allergies. Per patient's wife, he is not taking any medications at home regularly.      Removed amlodipine 2.5 mg    Pharmacy: [address])      Allergies:  Hydrocodone, Sulfa (sulfonamide antibiotics), and Ativan [lorazepam]    Significant PMH/Disease States:   Past Medical History:   Diagnosis Date    Arrhythmia     Arthritis     Calculus of kidney     GERD (gastroesophageal reflux disease)     Headache     Hypercholesterolemia     Hypertension     Prostate disease     TIA (transient ischemic attack) 03/05/2023     Chief Complaint for this Admission:    Chief Complaint   Patient presents with    Dysarthria     Prior to Admission Medications:   None       Luma Knott

## 2023-03-06 NOTE — CONSULTS
CARDIOLOGY CONSULTATION      REASON FOR CONSULT: CVA    REQUESTING PROVIDER: Hospitalist     CHIEF COMPLAINT:  AMS    HISTORY OF PRESENT ILLNESS:  Farzaneh Silver is a 80y.o. year-old male with past medical history significant for TIAs, HTN, HLD, dementia p/w AMS. He has been having gait instability and and memory issues for past few weeks. In ER he was hypertensive. CTA did not show any acute vessel occlusions or stenosis. MRI showed old infarcts. INPATIENT MEDICATIONS:  Home medications reviewed. Current Facility-Administered Medications:     clopidogreL (PLAVIX) tablet 75 mg, 75 mg, Oral, DAILY, Isaac Sanches MD    diphenhydrAMINE (BENADRYL) capsule 25 mg, 25 mg, Oral, Q6H PRN, Jeannette Foy PA-C    methylPREDNISolone (PF) (SOLU-MEDROL) injection 40 mg, 40 mg, IntraVENous, Q8H, Jeannette Foy PA-C, 40 mg at 03/06/23 1008    aspirin chewable tablet 81 mg, 81 mg, Oral, DAILY, Sophie Bence, MD, 81 mg at 03/06/23 1947    acetaminophen (TYLENOL) tablet 650 mg, 650 mg, Oral, Q4H PRN **OR** acetaminophen (TYLENOL) solution 650 mg, 650 mg, Per NG tube, Q4H PRN **OR** acetaminophen (TYLENOL) suppository 650 mg, 650 mg, Rectal, Q4H PRN, Sophie Bence, MD     ALLERGIES:  Allergies reviewed with the patient,  Allergies   Allergen Reactions    Hydrocodone Other (comments)     Side effects in form of balance problem    Sulfa (Sulfonamide Antibiotics) Rash    Ativan [Lorazepam] Itching    . FAMILY HISTORY:  Family history reviewed. SOCIAL HISTORY:  Notable for No  tobacco use, no heavy alcohol or illicit drug use. REVIEW OF SYSTEMS:  Complete review of systems performed, pertinents noted above, all other systems are negative. PHYSICAL EXAMINATION:   Cardiovascular exam has a heart with a RRR. No murmurs, normal S1 and S2. No murmur present. There are no rubs or gallops. Good peripheral pulses. No jugular venous distension. No carotid bruits are present. Respiratory exam reveals clear lung fields, no rales or rhonchi. Gastrointestinal exam has soft, nontender abdomen with normal bowel sounds. Lymphatic exam reveals no edema and no varicosities. No notable skin changes. Patient is acutely altered, wearing Mitts. Visit Vitals  BP (!) 180/101 (BP 1 Location: Right leg, BP Patient Position: Semi fowlers)   Pulse (!) 103   Temp 98.9 °F (37.2 °C)   Resp 18   Ht 5' 9\" (1.753 m)   Wt 76.2 kg (168 lb)   SpO2 92%   BMI 24.81 kg/m²         Recent labs results and imaging reviewed. Notable findings include   Lab Results   Component Value Date/Time    WBC 6.7 03/05/2023 05:09 PM    HGB 14.8 03/05/2023 05:09 PM    HCT 43.8 03/05/2023 05:09 PM    PLATELET 770 94/18/7608 05:09 PM    MCV 91.3 03/05/2023 05:09 PM     Lab Results   Component Value Date/Time    Glucose 92 03/05/2023 05:09 PM    Microalb/Creat ratio (ug/mg creat.) 56 (H) 06/27/2022 01:11 PM    LDL, calculated 108 (H) 06/27/2022 01:11 PM    Creatinine 1.36 (H) 03/05/2023 05:09 PM      No results found for: CPK, RCK1, RCK2, RCK3, RCK4, CKMB, CKNDX, CKND1, TROPT, TROIQ, BNPP, BNP  . 1) TIA: Full neurology consult pending.   - Echocardiogram with normal EF. No severe valve issues. - Agree with ASA and plavix   - There seems to be an aspect of dementia which makes prognosis poor  - Would do permissive HTN for now     2) HTN: Given possible CVA, would do permissive HTN  - Can give Hydralazine 10mg IV PRN for Bps over 094 systolic    Thank you for involving us in the care of this patient. Please call with questions.    Darline Borja MD  3/6/2023

## 2023-03-06 NOTE — PROGRESS NOTES
Attempted to see patient once more. Nsg reported that pt was not appropriate to see. Will follow-up later.

## 2023-03-06 NOTE — PROGRESS NOTES
Medicare Outpatient Observation Notice (MOON)/ [address] [place] [address] Outpatient Observation Notice (Gerhardt Sessions) provided to patient/representative with verbal explanation of the notice. Time allotted for questions regarding the notice. Patient /representative provided a completed copy of the MOON/VOON notice. Copy placed on bedside chart. Pt & friend ( Mr. Aline Messina) informed & pt signed.

## 2023-03-06 NOTE — H&P
's  Hospitalist History & Physical Notes. [facility]. Name : Edgar Adair      MRN number : [id number]     YOB: 1933     Subjective :   Chief Complaint : Speech disturbance intermittent, gait disturbance with unsteady gait    Source of information : Mostly the friend who takes care of him, patient does not know why he is here. History of present illness:   Edgar Adair is  80 y.o. male with history of hypertension, hyperlipidemia, TIA, history of migraine headaches presents to the emergency room as his caregiver found him with multiple episodes of speech disturbance, then notices that his gait is unsteady. He woke up this morning, his speech is very slurred unable to speak clearly, wife called caregiver who noticed it. It is resolved, and happening again, so wanted to come to the emergency room but patient refused. They always go for lunch at [facility], at that time he found him not able to walk properly and very unsteady. He has to get him a golf cart to go around. And then he continued to notice the symptoms and talk to the friends and brought to the emergency room even the patient was still refusing. When I was examining this patient who I am known very well in my practice in the past and he remembers me states that he does not want to stay. States he does not want to get any treatment, that he is 80-year-old and it is okay to have problems. Finally I convinced him to stay but throughout the stay he was not happy staying, friend and caregiver stayed with him throughout the night. This patient and his wife were well-known for me in my practice in the past, they had only one daughter who they last.  Since then they had a kind of underlying depression with  life. Both are very nice people, and try to help people.       Past Medical History:   Diagnosis Date    Arrhythmia     Arthritis     Calculus of kidney     GERD (gastroesophageal reflux disease)     Headache     Hypercholesterolemia     Hypertension     Prostate disease     TIA (transient ischemic attack) 03/05/2023     Past Surgical History:   Procedure Laterality Date    HX CHOLECYSTECTOMY      HX COLONOSCOPY      HX GI      HX PROSTATE SURGERY      prostate biopsy      Family History   Problem Relation Age of Onset    Heart Disease Mother     No Known Problems Father       Social History     Tobacco Use    Smoking status: Never    Smokeless tobacco: Never   Substance Use Topics    Alcohol use: Not Currently       Allergies   Allergen Reactions    Hydrocodone Other (comments)     Side effects in form of balance problem    Sulfa (Sulfonamide Antibiotics) Rash      Current Facility-Administered Medications   Medication Dose Route Frequency Provider Last Rate Last Admin    [START ON 3/6/2023] aspirin chewable tablet 81 mg  81 mg Oral DAILY Naye Mcgovern MD        acetaminophen (TYLENOL) tablet 650 mg  650 mg Oral Q4H PRN Naye Mcgovern MD        Or    acetaminophen (TYLENOL) solution 650 mg  650 mg Per NG tube Q4H PRN Naye Mcgovern MD        Or    acetaminophen (TYLENOL) suppository 650 mg  650 mg Rectal Q4H PRN Naye Mcgovern MD         Current Outpatient Medications   Medication Sig Dispense Refill    clopidogreL (Plavix) 75 mg tab Take 1 Tablet by mouth daily for 30 days. 30 Tablet 0    aspirin 81 mg cap Take 81 mg by mouth daily. 30 Each 0    atorvastatin (LIPITOR) 40 mg tablet Take 1 Tablet by mouth daily for 30 days. 30 Tablet 0    amLODIPine (NORVASC) 2.5 mg tablet Take 1 Tablet by mouth daily. 90 Tablet 1            Review of Systems:  He is keeps saying that he does not have any issues and he just wants to go home. Vitals:   Patient Vitals for the past 12 hrs:   Temp Pulse Resp BP SpO2   03/05/23 1703 97.7 °F (36.5 °C) 72 18 (!) 158/91 98 %       Physical Exam:   General : Looks comfortable, no respiratory distress noted.   HEENT : PERRLA, normal oral mucosa, atraumatic normocephalic, Normal ear and nose. Neck : Supple, no JVD, no masses noted, no carotid bruit. Lungs : Breath sounds with good air entry bilaterally, no wheezes or rales, no accessory muscle use. CVS : Rhythm rate regular, S1+, S2+, no murmur or gallop. Abdomen : Soft, nontender, no organomegaly, bowel sounds active. Extremities : No edema noted,  pedal pulses palpable. Musculoskeletal : Fair range of motion, no joint swelling or effusion, muscle tone and power appears normal.   Skin : Moist, warm,  no pathological rash. Lymphatic : No cervical lymphadenopathy. Neurological : Awake, alert, oriented to  place person. He just has some recent memory issues. Extraocular movements intact, facial no asymmetry. He has mild with no deficits. Deep tendon reflexes active, I did not check his gait. But able to move all 4 extremities. Psychiatric : He appears pleasant, very stubborn that he does not want any treatment and wants to go home. Data Review:   Recent Results (from the past 24 hour(s))   CBC WITH AUTOMATED DIFF    Collection Time: 03/05/23  5:09 PM   Result Value Ref Range    WBC 6.7 4.1 - 11.1 K/uL    RBC 4.80 4. 10 - 5.70 M/uL    HGB 14.8 12.1 - 17.0 g/dL    HCT 43.8 36.6 - 50.3 %    MCV 91.3 80.0 - 99.0 FL    MCH 30.8 26.0 - 34.0 PG    MCHC 33.8 30.0 - 36.5 g/dL    RDW 13.7 11.5 - 14.5 %    PLATELET 267 606 - 715 K/uL    MPV 10.5 8.9 - 12.9 FL    NRBC 0.0 0.0  WBC    ABSOLUTE NRBC 0.00 0.00 - 0.01 K/uL    NEUTROPHILS 59 32 - 75 %    LYMPHOCYTES 26 12 - 49 %    MONOCYTES 11 5 - 13 %    EOSINOPHILS 3 0 - 7 %    BASOPHILS 1 0 - 1 %    IMMATURE GRANULOCYTES 0 0 - 0.5 %    ABS. NEUTROPHILS 3.9 1.8 - 8.0 K/UL    ABS. LYMPHOCYTES 1.7 0.8 - 3.5 K/UL    ABS. MONOCYTES 0.8 0.0 - 1.0 K/UL    ABS. EOSINOPHILS 0.2 0.0 - 0.4 K/UL    ABS. BASOPHILS 0.1 0.0 - 0.1 K/UL    ABS. IMM.  GRANS. 0.0 0.00 - 0.04 K/UL    DF AUTOMATED     METABOLIC PANEL, COMPREHENSIVE    Collection Time: 03/05/23  5:09 PM   Result Value Ref Range    Sodium 138 136 - 145 mmol/L    Potassium 4.3 3.5 - 5.1 mmol/L    Chloride 107 97 - 108 mmol/L    CO2 27 21 - 32 mmol/L    Anion gap 4 (L) 5 - 15 mmol/L    Glucose 92 65 - 100 mg/dL    BUN 27 (H) 6 - 20 mg/dL    Creatinine 1.36 (H) 0.70 - 1.30 mg/dL    BUN/Creatinine ratio 20 12 - 20      eGFR 50 (L) >60 ml/min/1.73m2    Calcium 8.9 8.5 - 10.1 mg/dL    Bilirubin, total 0.6 0.2 - 1.0 mg/dL    AST (SGOT) 12 (L) 15 - 37 U/L    ALT (SGPT) 21 12 - 78 U/L    Alk. phosphatase 94 45 - 117 U/L    Protein, total 7.2 6.4 - 8.2 g/dL    Albumin 3.7 3.5 - 5.0 g/dL    Globulin 3.5 2.0 - 4.0 g/dL    A-G Ratio 1.1 1.1 - 2.2     TROPONIN-HIGH SENSITIVITY    Collection Time: 03/05/23  5:09 PM   Result Value Ref Range    Troponin-High Sensitivity 71 0 - 76 ng/L   NT-PRO BNP    Collection Time: 03/05/23  5:09 PM   Result Value Ref Range    NT pro- (H) <450 pg/mL   PROTHROMBIN TIME + INR    Collection Time: 03/05/23  5:09 PM   Result Value Ref Range    Prothrombin time 13.2 11.9 - 14.6 sec    INR 1.0 0.9 - 1.1     PTT    Collection Time: 03/05/23  5:09 PM   Result Value Ref Range    aPTT 27.0 21.2 - 34.1 sec    aPTT, therapeutic range   82 - 109 sec       Radiologic Studies :   CT Results  (Last 48 hours)                 03/05/23 1719  CTA CODE NEURO HEAD AND NECK W CONT Final result    Impression:      CTA    1. CTA neck demonstrates no large vessel occlusion, high-grade stenosis or   aneurysm. 2. CTA head demonstrates no large vessel occlusion, high-grade stenosis or   aneurysm. 3. No intracranial mass or enhancing lesion. Chronic right MCA territory   infarct. Others:     . Chronic maxillary sinusitis. Narrative:  EXAM:  CTA CODE NEURO HEAD AND NECK W CONT       INDICATION:   dysarthria       COMPARISON:  None. CONTRAST:  100 mL of Isovue-370. TECHNIQUE:  Unenhanced  images were obtained to localize the volume for   acquisition.   Multislice helical axial CT angiography was performed from the   aortic arch to the top of the head during uneventful rapid bolus intravenous   contrast administration. Coronal and sagittal reformations and 3D/MIP  post   processing were performed. CT dose reduction was achieved through use of a standardized protocol tailored   for this examination and automatic exposure control for dose modulation. This   study was analyzed by the [address] ai algorithm. FINDINGS:       CTA Head:   There is no evidence of large vessel occlusion or flow-limiting stenosis of the   intracranial internal carotid, anterior cerebral, and middle cerebral arteries. Calcification of the bilateral carotid siphons without significant stenosis. The   anterior communicating artery is patent. There is no evidence of large vessel occlusion or flow-limiting stenosis of the   intracranial vertebral arteries, basilar artery, or posterior cerebral arteries. There is patent left posterior communicating artery. There is no evidence of aneurysm or vascular malformation. The dural venous   sinuses and deep cerebral venous system are patent. No evidence of abnormal   enhancement on delayed phase images. Brain parenchyma demonstrates no suggestion of acute infarct. Posterior right   frontal encephalomalacia, consistent with infarct. Patchy periventricular and   deep white matter ill-defined hypodensities, nonspecific and likely   microangiopathic white matter disease. CTA NECK:   Dental amalgam associated metallic artifact precludes optimal evaluation of the   adjacent structures, oral cavity and oropharynx/skull base, including the   mid/distal internal carotid arteries bilaterally. NASCET method was utilized for calculating stenosis. The aortic arch is unremarkable. The common carotid arteries demonstrate no   significant stenosis. There is no evidence of significant stenosis in the   cervical right internal carotid artery.  There is no evidence of significant   stenosis in the cervical left internal carotid artery. Carotid bulbs plaques   without significant luminal narrowing.    % of right carotid artery stenosis: 0   % of left carotid artery stenosis: 0       There is a left vertebral artery dominant vertebrobasilar arterial system. The   cervical vertebral arteries are normal in course, size and contour without   significant stenosis. Status post bilateral lense replacement. The orbits are otherwise unremarkable. Visualized soft tissues of the skull base and neck are unremarkable. Marked   diffuse mucosal thickening of the left maxillary sinus with associated trace   diffuse osteitis, suggestive of chronic maxillary sinusitis. Visualized lung   apices demonstrate 7 mm calcified old granuloma in the periventricular right   lower lobe along right major fissure (image 1 of series 402). No acute fracture or aggressive osseous lesion. C3-C4 ACDF without obvious   hardware components fracture or loosening. Multilevel degenerative disc changes   with associated at least mild spinal canal and bilateral neural foramina   narrowing at C5-C6 true C7-T1.           03/05/23 8155  CT CODE NEURO HEAD WO CONTRAST Final result    Impression:  1. No acute intracranial abnormality. 2. 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 3. Left maxillary sinusitis           Narrative:          INDICATION: dysarthria       EXAM: CT HEAD WITHOUT CONTRAST. COMPARISON: None . PROCEDURE: Unenhanced head CT. Axial images were obtained from skull base to the   vertex. Images were reformatted in the coronal and sagittal planes. Soft tissue   and bone windows were examined. No contrast. CT dose reduction was achieved   through use of a standardized protocol tailored for this examination and   automatic exposure control for dose modulation.         FINDINGS: CT of the head performed at 1609 hours is presented for interpretation   at 1730 hours. Cerebral sulci and ventricular size are increased, but commensurate with age. There are patchy diffuse mild periventricular white matter changes. While   nonspecific these are likely due to small vessel ischemic change. Focal low   density/encephalomalacia in the right frontoparietal lobe suggests old ischemia. There is no evidence of midline shift, extra-axial collection, mass lesion or   mass effect. No evidence of acute bleed. No acute bony abnormality. No obvious   acute ischemia. Mucoperiosteal thickening with a possible air-fluid level noted in the left   maxillary sinus cysts. .                     Assessment and Plan :     TIA: Intermittent slurred speech, but did not have any more episodes in the emergency room. Unsteady gait as per the family, need further evaluation. History of benign essential hypertension: On small dose of amlodipine but he is not taking. He has a history of severe migraine headaches that is treated with Depakote, but states he is not taking any medications at this time. Admitted for observation to medical telemetry, requested for an MRI of your head. Home medications none. I am not sure if he is going to stay in the hospital, but will try. CC : Mary Mahoney MD  Signed By: Suzanne Healy MD     March 5, 2023      This dictation was done by dragon, computer voice recognition software. Often unanticipated grammatical, syntax, [place] phones and other interpretive errors are inadvertently transcribed. Please excuse errors that have escaped final proofreading.

## 2023-03-07 ENCOUNTER — ANESTHESIA EVENT (OUTPATIENT)
Dept: NON INVASIVE DIAGNOSTICS | Age: 88
DRG: 066 | End: 2023-03-07
Payer: MEDICARE

## 2023-03-07 PROCEDURE — 84439 ASSAY OF FREE THYROXINE: CPT

## 2023-03-07 PROCEDURE — 97530 THERAPEUTIC ACTIVITIES: CPT

## 2023-03-07 PROCEDURE — 97165 OT EVAL LOW COMPLEX 30 MIN: CPT

## 2023-03-07 PROCEDURE — 74011250637 HC RX REV CODE- 250/637: Performed by: INTERNAL MEDICINE

## 2023-03-07 PROCEDURE — 74011636637 HC RX REV CODE- 636/637: Performed by: HOSPITALIST

## 2023-03-07 PROCEDURE — 36415 COLL VENOUS BLD VENIPUNCTURE: CPT

## 2023-03-07 PROCEDURE — 97161 PT EVAL LOW COMPLEX 20 MIN: CPT

## 2023-03-07 PROCEDURE — 74011250637 HC RX REV CODE- 250/637: Performed by: HOSPITALIST

## 2023-03-07 PROCEDURE — 92523 SPEECH SOUND LANG COMPREHEN: CPT

## 2023-03-07 PROCEDURE — A9270 NON-COVERED ITEM OR SERVICE: HCPCS | Performed by: HOSPITALIST

## 2023-03-07 PROCEDURE — G0378 HOSPITAL OBSERVATION PER HR: HCPCS

## 2023-03-07 PROCEDURE — 92610 EVALUATE SWALLOWING FUNCTION: CPT

## 2023-03-07 RX ORDER — AMLODIPINE BESYLATE 5 MG/1
5 TABLET ORAL DAILY
Status: DISCONTINUED | OUTPATIENT
Start: 2023-03-08 | End: 2023-03-08

## 2023-03-07 RX ORDER — LISINOPRIL 5 MG/1
5 TABLET ORAL DAILY
Status: DISCONTINUED | OUTPATIENT
Start: 2023-03-08 | End: 2023-03-07

## 2023-03-07 RX ORDER — PREDNISONE 20 MG/1
20 TABLET ORAL 2 TIMES DAILY WITH MEALS
Status: DISCONTINUED | OUTPATIENT
Start: 2023-03-07 | End: 2023-03-08

## 2023-03-07 RX ADMIN — CLOPIDOGREL BISULFATE 75 MG: 75 TABLET ORAL at 09:28

## 2023-03-07 RX ADMIN — PREDNISONE 20 MG: 20 TABLET ORAL at 16:48

## 2023-03-07 RX ADMIN — ASPIRIN 81 MG 81 MG: 81 TABLET ORAL at 09:28

## 2023-03-07 NOTE — ANESTHESIA PREPROCEDURE EVALUATION
Relevant Problems   No relevant active problems       Anesthetic History   No history of anesthetic complications            Review of Systems / Medical History  Patient summary reviewed, nursing notes reviewed and pertinent labs reviewed    Pulmonary                   Neuro/Psych       CVA (SLURRED SPEECH, GAIT DISTURBANCE. )  Dementia    Comments: ALTERED MENTAL STATUS. Cardiovascular    Hypertension        Dysrhythmias : atrial fibrillation  Hyperlipidemia      Comments: ECHO 3-6-23: Interpretation Summary    Left Ventricle: Normal left ventricular systolic function with a visually estimated EF of 55 - 60%. Left ventricle size is normal. Normal wall thickness. Normal wall motion. Abnormal diastolic function.   Aortic Valve: Mild regurgitation.   Mitral Valve: Mildly thickened leaflet.   Aorta: Mildly dilated aortic root. Mildly dilated ascending aorta.        GI/Hepatic/Renal     GERD           Endo/Other        Arthritis     Other Findings   Comments: PLAVIX: LAST DOSE ON 3-7-23.           Physical Exam    Airway  Mallampati: II  TM Distance: 4 - 6 cm  Neck ROM: normal range of motion   Mouth opening: Normal     Cardiovascular      Rate: normal         Dental    Dentition: Lower dentition intact and Upper dentition intact     Pulmonary  Breath sounds clear to auscultation               Abdominal  GI exam deferred       Other Findings            Anesthetic Plan    ASA: 3  Anesthesia type: total IV anesthesia    Monitoring Plan: Continuous noninvasive hemodynamic monitoring      Induction: Intravenous  Anesthetic plan and risks discussed with: Patient

## 2023-03-07 NOTE — PROGRESS NOTES
CM reviewed chart. Patient still pending PT/OT eval when patient is able to participate. CM also read note that patient will need holter monitor at discharge. CM has reached out to Ernestina Virgen in [facility] to determine process for obtaining holter monitor for this patient. CM awaiting a call back. CM will continue to follow up.

## 2023-03-07 NOTE — PROGRESS NOTES
PHYSICAL THERAPY EVALUATION  Patient: Carlo Ross (42 y.o. male)  Date: 3/7/2023  Primary Diagnosis: TIA (transient ischemic attack) [G45.9]       Precautions: falls    In place during session: EKG/telemetry   ASSESSMENT  Pt is a 80 y.o. male admitted on 3/5/2023 for intermittent speech disturbances and unsteady gait; pt currently being treated for CVA/TIA workup, HTN, and unsteady gait. Head CT no acute IC abnormality, microvascular ischemic, old ischemic and other age related changes, left maxillary sinusitis. Head/neck CTA no large vessel occlusion, high grade stenosis or aneurysm present with chronic right MCA infarct. Brain MRI few tiny acute cortical infarcts in the left frontal lobe, left parietal and left external capsule. Mild chronic microvascular ischemic disease. Chronic infarct in the right frontal lobe and small chronic infarcts in the left posterior basal ganglia and bilateral cerebellum. Pt semi-supine in bed with 1:1 present upon PT/OT arrival, agreeable to evaluation. Pt A&O x person, place, and current year but not current month. Based on the objective data described, the patient presents with generalized weakness, impaired functional mobility, impaired amb, impaired balance, decreased right sided coordination, decreased safety awareness, and poor insight into deficits. (See below for objective details and assist levels). Overall pt tolerated session fair today with no c/o pain throughout session. Pt demonstrates impaired coordination on right present with finger opposition and finger to nose, intact sensation, and 3+/5 BLE MMT. Pt amb 30 feet (bed<> bathroom sink), with NBOS, short, shuffling, step to gt pattern with accelerated sanjiv and forward shifted DIONNE; demonstrates generalized unsteadiness.  Pt amb to bathroom without AD, RW utilized from bathroom demonstrates improved sanjiv safety and steadiness but continued to demonstrates increased unsteadiness with retro-walking and directional changes. Pt will benefit from continued skilled PT to address above deficits and return to PLOF. Current PT DC recommendation Inpatient Rehabilitation Facility  once medically appropriate. Current Level of Function Impacting Discharge (mobility/balance): min A    Other factors to consider for discharge: severity of deficits, unconfirmed PLOF, acute medical state      PLAN :  Recommendations and Planned Interventions: bed mobility training, transfer training, gait training, therapeutic exercises, patient and family training/education, and therapeutic activities      Recommend for staff: Amb to bathroom for toileting with gt belt and AD    Frequency/Duration: Patient will be followed by physical therapy:  3-5x/week to address goals. 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     OBJECTIVE DATA SUMMARY:   HISTORY:    Past Medical History:   Diagnosis Date    Arrhythmia     Arthritis     Calculus of kidney     GERD (gastroesophageal reflux disease)     Headache     Hypercholesterolemia     Hypertension     Prostate disease     TIA (transient ischemic attack) 03/05/2023     Past Surgical History:   Procedure Laterality Date    HX CHOLECYSTECTOMY      HX COLONOSCOPY      HX GI      HX PROSTATE SURGERY      prostate biopsy        Home Situation  Home Environment: Private residence  # Steps to Enter: 3  Rails to Enter: Yes  Hand Rails : Bilateral  One/Two Story Residence: One story  Living Alone: No  Support Systems: Spouse/Significant Other  Patient Expects to be Discharged to[de-identified] Home  Current DME Used/Available at Home: Cane, straight  Tub or Shower Type: Shower    EXAMINATION/PRESENTATION/DECISION MAKING:   Critical Behavior:  Neurologic State: Alert  Orientation Level: Oriented to person, Oriented to place, Disoriented to time (Disoriented to month, oriented to year)  Cognition: Follows commands, Impaired decision making, Impulsive, Poor safety awareness  Safety/Judgement: Decreased awareness of need for assistance, Decreased awareness of need for safety, Decreased insight into deficits, Decreased awareness of environment  Hearing: Auditory  Auditory Impairment: Hard of hearing, right side, Hard of hearing, left side  Skin:  intact where visible  Edema: none noted   Range Of Motion:  AROM: Generally decreased, functional                       Strength:    Strength: Generally decreased, functional                    Tone & Sensation:                  Sensation: Intact               Coordination:  Coordination: Generally decreased, functional (BUE FM and GM impaired, LUE GM decreased compared to RUE)  Functional Mobility:  Bed Mobility:  Rolling: Contact guard assistance  Supine to Sit: Contact guard assistance     Scooting: Contact guard assistance  Transfers:  Sit to Stand: Minimum assistance  Stand to Sit: Minimum assistance                       Balance:   Sitting: Intact  Standing: Impaired; With support  Standing - Static: Constant support;Fair;Poor  Standing - Dynamic : Constant support;Poor  Ambulation/Gait Training:  Distance (ft): 30 Feet (ft) (bed<> bathroom)  Assistive Device: Gait belt (15 feet without AD, 15 feet with RW)  Ambulation - Level of Assistance: Minimal assistance     Gait Description (WDL): Exceptions to WDL           Base of Support: Narrowed     Speed/Sakshi: Accelerated    Therapeutic Exercises:   Pt would benefit from LE HEP to improve overall LE AROM/strength and can be further educated in next treatment session.     Functional Measure:  [facility] AM-PAC [facility]         Basic Mobility Inpatient Short Form  How much difficulty does the patient currently have. .. Unable A Lot A Little None   1. Turning over in bed (including adjusting bedclothes, sheets and blankets)? [] 1   [] 2   [x] 3   [] 4   2. Sitting down on and standing up from a chair with arms ( e.g., wheelchair, bedside commode, etc.)   [] 1   [] 2   [x] 3   [] 4   3. Moving from lying on back to sitting on the side of the bed? [] 1   [] 2   [x] 3   [] 4          How much help from another person does the patient currently need. .. Total A Lot A Little None   4. Moving to and from a bed to a chair (including a wheelchair)? [] 1   [] 2   [x] 3   [] 4   5. Need to walk in hospital room? [] 1   [] 2   [x] 3   [] 4   6. Climbing 3-5 steps with a railing? [] 1   [x] 2   [] 3   [] 4   © , Trustees of [facility] [employer], under license to [employer]. All rights reserved     Score:  Initial:  Most Recent: X (Date: 3/7/2023 )   Interpretation of Tool:  Represents activities that are increasingly more difficult (i.e. Bed mobility, Transfers, Gait). Score 24 23 22-20 19-15 14-10 9-7 6   Modifier CH CI CJ CK CL CM CN         Physical Therapy Evaluation Charge Determination   History Examination Presentation Decision-Making   HIGH Complexity :3+ comorbidities / personal factors will impact the outcome/ POC  HIGH Complexity : 4+ Standardized tests and measures addressing body structure, function, activity limitation and / or participation in recreation  LOW Complexity : Stable, uncomplicated  Other outcome measures ampac 6  mod      Based on the above components, the patient evaluation is determined to be of the following complexity level: low    Pain Ratin/10 reported    Activity Tolerance:   Fair and requires rest breaks    After treatment patient left in no apparent distress:   Bed locked and in lowest position Call bell within reach and seated safely EOB talking to family member on phone, 1:1 and nsg in room  and updated.     GOALS:    Problem: Mobility Impaired (Adult and Pediatric)  Goal: *Acute Goals and Plan of Care (Insert Text)  Description: FUNCTIONAL STATUS PRIOR TO ADMISSION: Patient was independent and active without use of DME. Patient was independent for basic and instrumental ADLs. HOME SUPPORT PRIOR TO ADMISSION: The patient lived with wife but did not require assist. Pt reports he drove prior to admission though he reports 3-4 falls in the past 3 months. Caregiver mentioned in H&P, unclear of assistance required at home. Physical Therapy Goals  Initiated 3/7/2023  Pt stated goal: to go home  Pt will be I with LE HEP in 7 days. Pt will perform bed mobility with mod I in 7 days. Pt will perform transfers with mod I in 7 days. Pt will amb  feet with LRAD safely with mod I in 7 days. Outcome: Not Met       COMMUNICATION/EDUCATION:   The patients plan of care was discussed with: Occupational therapist, Registered nurse, and Case management. Fall prevention education was provided and the patient/caregiver indicated understanding., Patient/family have participated as able in goal setting and plan of care. , and Patient/family agree to work toward stated goals and plan of care. PT/OT sessions occurred together for increased safety of pt and clinician.        Thank you for this referral.  Zachary Quiroz, PT, DPT   Time Calculation: 21 mins

## 2023-03-07 NOTE — PROGRESS NOTES
Problem: Communication Impaired (Adult)  Goal: *Acute Goals and Plan of Care (Insert Text)  Description: Patient will demonstrate dysarthria strategies with min cues within 7 days. Patient will demonstrate situational awareness and safety precautions independently within 7 days. Patient will demonstrate speech intelligibility 90% without cues within 7 days. Patient will demonstrate conversational/complex naming 90% without cues within 7 days. Outcome: Not Met   SPEECH LANGUAGE PATHOLOGY BEDSIDE SWALLOW AND SPEECH EVALUATIONS  Patient: Silver Palomo (41 y.o. male)  Date: 3/7/2023  Primary Diagnosis: TIA (transient ischemic attack) [G45.9]       Precautions: aspiration       ASSESSMENT :  Based on the objective data described below, the patient presents with oropharyngeal sw function WNL, moderate dysarthria, mild expressive aphasia. Pt seen for bedside sw and speech/lang assessment. Pt's adopted daughter and wife are present at bedside with pts consent. Pt admitted for CVA workup c/b AMS, difficulty with speech, weakness. Hx noted below. 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 Pt's highest level of education is some college. Pt is right handed. Pt wears glasses at baseline. Pt is [place] but does not use hearing aids (keeps them in the drawer per pt). Pt is typically independent at home and walks without AD. Pt's baseline speech and lang is normal but is typically soft spoken. Oral motor function WNL. Pt with intact dentition. Vocal quality is functional but hypophonic. Sw assessment: Pt able to self-feed without difficulty trials of thin via cup/straw, puree and soft solids. Pt with mild pharyngeal phase delay but otherwise, oropharyngeal sw function WNL and pt tolerates all trials without overt s/s aspiration. Nsg reports pt tolerates reg/thin, meds PO without difficulty. Speech/lang/motor speech assessment:  Pt is jovial and jokes with clinician, engages readily. Conversational word finding noted. Subjective intelligibility 60% without requests for repetition. Pt with reduced articulatory precision, syllable and cluster reduction. Auditory comprehension: 1-2- and mutli-step complex commands 100%. Simple and complex yes/no 100% with pt joking about responses. Overall, auditory comprehension WNL despite hearing loss. Pt with appropriate comprehension of conversational information. Auto speech tasks: 100%, confrontational namin% with intermittent delays. Intermittent difficulty with conversational word finding. Repetition intact with dysarthric errors. Motor speech: AMRs slow and indistinct, difficulty producing. SMR: slow with improved precision and clarity. Pt reads communication board in room without difficulty. Writing not assessed at this time. Pt is not able to state reason for acute admission despite nsg and family reviewing CVA with pt. Patient will benefit from skilled intervention to address the above impairments. Patients rehabilitation potential is considered to be Excellent     PLAN :  Recommendations and Planned Interventions:  Recommend cont SLP tx for dysarthria, aphasia, further [id number] diagnostic tx. No further sw tx indicated at this time. Cont reg/thin diet with meds as tolerated, aspiration and GERD precautions. Frequency/Duration: Patient will be followed by speech-language pathology 5 times a week to address goals. Discharge Recommendations: Inpatient Rehab     SUBJECTIVE:   Patient alert, agreeable, pleasant and engaged.     OBJECTIVE:     Past Medical History:   Diagnosis Date    Arrhythmia     Arthritis     Calculus of kidney     GERD (gastroesophageal reflux disease)     Headache     Hypercholesterolemia     Hypertension     Prostate disease     TIA (transient ischemic attack) 2023     Past Surgical History:   Procedure Laterality Date    HX CHOLECYSTECTOMY      HX COLONOSCOPY      HX GI      HX PROSTATE SURGERY      prostate biopsy      Prior Level of Function/Home Situation: Independent   Home Situation  Home Environment: Private residence  # Steps to Enter: 3  Rails to Enter: Yes  Hand Rails : Bilateral  One/Two Story Residence: One story  Living Alone: No  Support Systems: Spouse/Significant Other  Patient Expects to be Discharged to[de-identified] Home  Current DME Used/Available at Home: Cane, straight  Tub or Shower Type: Shower  Diet prior to admission: reg/thin  Current Diet:  reg/thin   Cognitive and Communication Status:  Neurologic State: Alert  Orientation Level: Oriented X4  Cognition: Appropriate decision making, Appropriate for age attention/concentration, Appropriate safety awareness, Follows commands        Safety/Judgement: Decreased awareness of need for assistance, Decreased awareness of need for safety, Decreased insight into deficits, Decreased awareness of environment    Pain:  0    After treatment:   Patient left in no apparent distress in bed, Call bell within reach, Nursing notified, Caregiver / family present, and 1:1 sitter present    COMMUNICATION/EDUCATION:   Patient was educated regarding his deficit(s) of dysarthria/aphasia as this relates to his diagnosis of CVA. He demonstrated Good understanding as evidenced by verbal responsiveness, benefits from reinforcement. Pt's wife and adopted daughter present at bedside with pt's consent. The patient's plan of care including recommendations, planned interventions, and recommended diet changes were discussed with: Registered nurse. Patient/family have participated as able in goal setting and plan of care. Patient/family agree to work toward stated goals and plan of care.     Thank you for this referral.  Aylin Stanton M.S. CCC-SLP  Time Calculation: 28 mins

## 2023-03-07 NOTE — PROGRESS NOTES
This nurse presented the pt. With the stroke booklet. Teaching was done at the bedside with the pt. This nurse taught on what is a stroke, signs/symptoms of a stroke, how a stroke is diagnosed and possible medications a stroke pt. May receive. Pt. Verbalized and understanding of this nurse taught and discussed with him.

## 2023-03-07 NOTE — PROGRESS NOTES
OCCUPATIONAL THERAPY EVALUATION  Patient: Marisol Salguero (89 y.o. male)  Date: 3/7/2023  Primary Diagnosis: TIA (transient ischemic attack) [G45.9]       Precautions: fall risk     In place during session: Peripheral IV and EKG/telemetry     ASSESSMENT  Pt is a 80 y.o. male presenting to [facility] with c/o dysarthria, admitted 3/5/23 and currently being treated for acute CVA with gait disturbance, HTN, low TSH, and DVT prophylaxis. 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 See below for home and PLOF information. Pt received semi-supine in bed upon arrival. Pt AXO person, place, and year, disoriented to month. Agreeable to OT evaluation. Based on current observations, pt presents with deficits in generalized strength/AROM, balance (see PT note for gait details), functional activity tolerance, coordination, cognition/confusion, and decreased safety awareness currently impacting overall performance of ADLs and functional transfers/mobility (see below for objective details and assist levels). Overall, pt tolerates session fair with pt completing bed mobility, sit<>stand transfers, ambulating to and from bathroom using RW and standing at sink to wash face with CGA-min A overall. Pt demo'ing generally decreased, symmetrical strength/AROM of BUE , shoulder flexion/extension, and elbow flexion/extension. Pt demo'ing generally decreased FM and GM coordination of BUE (see details below). Pt able to adjust socks while long sitting on bed with SBA. Pt initially ambulated to the bathroom without AD, however, pt unsteady with multiple LOB while standing at the sink so RW provided for ambulating back to bedside. Pt educated on holding onto sink to steady self while standing for grooming tasks.  Pt demo'd understanding, however, continued to be hesitant to use the sink for steadying. Pt educated to reach back for bed during stand>sit transfer. Pt would benefit from continued skilled OT services to address current impairments and improve IND and safety with self cares and functional transfers/mobility. Current OT d/c recommendation [facility]patient [facility] Facility  once medically appropriate. Other factors to consider for discharge: family/social support, DME, time since onset, severity of deficits, functional baseline     Patient will benefit from skilled therapy intervention to address the above noted impairments. PLAN :  Recommendations and Planned Interventions: self care training, functional mobility training, therapeutic exercise, balance training, therapeutic activities, cognitive retraining, endurance activities, neuromuscular re-education, patient education, home safety training, and family training/education    Recommend with staff: Encourage HEP in prep for ADLs/mobility, Amb to bathroom for toileting with gt belt and AD, and Use of bed/chair alarm for safety    Recommend next session: Toileting and Standing grooming    Frequency/Duration: Patient will be followed by occupational therapy:  3-5x/week to address goals. Recommendation for discharge: (in order for the patient to meet his/her long term goals)  [address] [facility],[address]     This discharge recommendation:  Has been made in collaboration with the attending provider and/or case management    IF patient discharges home will need the following DME: shower chair and walker: rolling       SUBJECTIVE:   Patient stated I was a .     OBJECTIVE DATA SUMMARY:   HISTORY:   Past Medical History:   Diagnosis Date    Arrhythmia     Arthritis     Calculus of kidney     GERD (gastroesophageal reflux disease)     Headache     Hypercholesterolemia     Hypertension     Prostate disease     TIA (transient ischemic attack) 03/05/2023     Past Surgical History:   Procedure Laterality Date    HX CHOLECYSTECTOMY      HX COLONOSCOPY      HX GI      HX PROSTATE SURGERY      prostate biopsy        Per pt:   Home Situation  Home Environment: Private residence  # Steps to Enter: 3  Rails to Enter: Yes  Hand Rails : Bilateral  One/Two Story Residence: One story  Living Alone: No  Support Systems: Spouse/Significant Other  Patient Expects to be Discharged to[de-identified] Home  Current DME Used/Available at Home: Cane, straight  Tub or Shower Type: Shower    Hand dominance: Right    EXAMINATION OF PERFORMANCE DEFICITS:  Cognitive/Behavioral Status:  Neurologic State: Alert  Orientation Level: Oriented to person;Oriented to place; Disoriented to time (Disoriented to month, oriented to year)  Cognition: Follows commands; Impaired decision making; Impulsive;Poor safety awareness        Safety/Judgement: Decreased awareness of need for assistance;Decreased awareness of need for safety;Decreased insight into deficits; Decreased awareness of environment    Hearing: Auditory  Auditory Impairment: Hard of hearing, right side, Hard of hearing, left side    Range of Motion:  AROM: Generally decreased, functional    Strength:  Strength: Generally decreased, functional    Coordination:  Coordination: Generally decreased, functional (BUE FM and GM impaired, LUE GM decreased compared to RUE)  Fine Motor Skills-Upper: Left Impaired;Right Impaired    Gross Motor Skills-Upper: Left Impaired;Right Impaired     Finger Opposition Finger to Nose Proprioception   Right UE  [] Intact    [x] Impaired   [] Intact    [x] Impaired [] Intact    [x] Impaired   Left UE [] Intact    [x] Impaired [] Intact    [x] Impaired [] Intact    [x] Impaired     Tone & Sensation:  Sensation: Intact    Balance:  Sitting: Intact  Standing: Impaired; With support  Standing - Static: Constant support;Fair;Poor  Standing - Dynamic : Constant support;Poor    Functional Mobility and Transfers for ADLs:  Bed Mobility:  Rolling: Contact guard assistance  Supine to Sit: Contact guard assistance  Scooting: Contact guard assistance    Transfers:  Sit to Stand: Minimum assistance  Stand to Sit: Minimum assistance  Bathroom Mobility: Minimum assistance      ADL Intervention and task modifications:  Grooming  Position Performed: Standing (At sink)  Washing Face: Minimum assistance    Lower Body Dressing Assistance  Socks: Stand-by assistance  Position Performed: Long sitting on bed    Cognitive Retraining  Safety/Judgement: Decreased awareness of need for assistance;Decreased awareness of need for safety;Decreased insight into deficits; Decreased awareness of environment    Therapeutic Exercise:  Pt would benefit from UE HEP in prep for ADLs and functional mobility/transfers. Functional Measure:    [facility] AM-PACTM \"6 Clicks\"                                                       Daily Activity Inpatient Short Form  How much help from another person does the patient currently need. .. Total; A Lot A Little None   1. Putting on and taking off regular lower body clothing? []  1 []  2 [x]  3 []  4   2. Bathing (including washing, rinsing, drying)? []  1 [x]  2 []  3 []  4   3. Toileting, which includes using toilet, bedpan or urinal? [] 1 [x]  2 []  3 []  4   4. Putting on and taking off regular upper body clothing? []  1 []  2 [x]  3 []  4   5. Taking care of personal grooming such as brushing teeth? []  1 []  2 [x]  3 []  4   6. Eating meals? []  1 []  2 [x]  3 []  4   © 2007, Trustees of [facility], under license to [employer]. All rights reserved     Score: 16/24     Interpretation of Tool:  Represents clinically-significant functional categories (i.e. Activities of daily living).   Percentage of Impairment CH    0%   CI    1-19% CJ    20-39% CK    40-59% CL    60-79% CM    80-99% CN     100%   AMPA  Score 6-24 24 23 20-22 15-19 10-14 7-9 6     Occupational Therapy Evaluation Charge Determination   History Examination Decision-Making   LOW Complexity : Brief history review  MEDIUM Complexity : 3-5 performance deficits relating to physical, cognitive , or psychosocial skils that result in activity limitations and / or participation restrictions MEDIUM Complexity : Patient may present with comorbidities that affect occupational performnce. Miniml to moderate modification of tasks or assistance (eg, physical or verbal ) with assesment(s) is necessary to enable patient to complete evaluation       Based on the above components, the patient evaluation is determined to be of the following complexity level: LOW     Pain Ratin/10    Activity Tolerance:   Fair and requires rest breaks    After treatment patient left in no apparent distress:    Call bell within reach and seated EOB with 1:1 and nsg at bedside passing medications , bed locked and in lowest position    COMMUNICATION/EDUCATION:   The patients plan of care was discussed with: Physical therapist and Registered nurse. Patient/family agree to work toward stated goals and plan of care. This patients plan of care is appropriate for delegation to [facility]. PT/OT sessions occurred together for increased safety of pt and clinician. Thank you for this referral.  Darlene Castillo OT  Time Calculation: 24 mins    Problem: Self Care Deficits Care Plan (Adult)  Goal: *Acute Goals and Plan of Care (Insert Text)  Description:   FUNCTIONAL STATUS PRIOR TO ADMISSION: Patient was independent and active without use of DME. Patient was independent for basic and instrumental ADLs. Patient reports he has a SPC if needed and walk in shower. HOME SUPPORT: The patient lived with his wife but did not require assist.    Occupational Therapy Goals  Initiated 3/7/2023  Patient Goal: Pt did not state. 1.  Patient will perform lower body dressing with independence within 7 day(s). 2.  Patient will perform grooming with independence within 7 day(s).   3.  Patient will perform bathing with independence within 7 day(s). 4.  Patient will perform toilet transfers with independence within 7 day(s). 5.  Patient will perform all aspects of toileting with independence within 7 day(s). 6.  Patient will participate in upper extremity therapeutic exercise/activities with independence within 7 day(s).     Outcome: Not Met

## 2023-03-07 NOTE — PROGRESS NOTES
Hospitalist Progress Note        Daily Progress Note: 3/7/2023    Hospital Course     Florian Nissen is  80 y.o. male with history of hypertension, hyperlipidemia, TIA, history of migraine headaches presents to the emergency room as his caregiver found him with multiple episodes of speech disturbance, then noticed that his gait is unsteady. He woke up this morning, his speech is very slurred unable to speak clearly, wife called caregiver who noticed it. It is resolved, and happening again, so wanted to come to the emergency room but patient refused. They always go for lunch at [address] [facility], at that time he found him not able to walk properly and very unsteady. He has to get him a golf cart to go around. Eventually presented to ED. In the ED, hypertensive otherwise hemodynamically stable. Significant labs showing BUN 27, creatinine 1.36. CTA head and neck shows no large vessel occlusion or stenosis. MRI of brain showing few tiny acute cortical infarcts in the left frontal lobe and additional tiny acute infarcts in left parietal lobe and left external capsule. Notes chronic infarct in right frontal lobe and additional small chronic infarcts in left posterior basal ganglia and bilateral cerebellum. Telemetry neurologist recommending to continue ASA and Plavix for 21 days. Also recommending echocardiogram and possible Holter monitor. Cardiology consulted. Patient agitated and confused in the ED. Given ativan, however he became itchy with hives on chest. Given benadryl and started on IV steroids. Subjective:   3/7/23:  Patient seen for the first time, chart was reviewed. Patient has sitter in the room. He was awake alert answered questions appropriately. MRI brain did show stroke on the left side of the brain. No other acute issues reported to me by patient or staff at this time.       Objective:     Current Facility-Administered Medications   Medication Dose Route Frequency    maalox/viscous lidocaine/diphenhydramine (GI COCKTAIL) oral solution 30 mL  30 mL Oral ONCE    predniSONE (DELTASONE) tablet 20 mg  20 mg Oral BID WITH MEALS    mylanta/viscous lidocaine (GI COCKTAIL)  40 mL Oral ONCE    [START ON 3/8/2023] amLODIPine (NORVASC) tablet 5 mg  5 mg Oral DAILY    clopidogreL (PLAVIX) tablet 75 mg  75 mg Oral DAILY    diphenhydrAMINE (BENADRYL) capsule 25 mg  25 mg Oral Q6H PRN    OLANZapine (ZyPREXA) 2.5 mg in sterile water (preservative free) 0.5 mL injection  2.5 mg IntraMUSCular Q4H PRN    aspirin chewable tablet 81 mg  81 mg Oral DAILY    acetaminophen (TYLENOL) tablet 650 mg  650 mg Oral Q4H PRN    Or    acetaminophen (TYLENOL) solution 650 mg  650 mg Per NG tube Q4H PRN    Or    acetaminophen (TYLENOL) suppository 650 mg  650 mg Rectal Q4H PRN       Visit Vitals  BP (!) 142/80 (BP 1 Location: Left upper arm, BP Patient Position: At rest;Supine)   Pulse 75   Temp 97.4 °F (36.3 °C)   Resp 18   Ht 5' 9\" (1.753 m)   Wt 76.2 kg (168 lb)   SpO2 100%   BMI 24.81 kg/m²      O2 Device: None    Temp (24hrs), Av.9 °F (36.6 °C), Min:97.4 °F (36.3 °C), Max:98.9 °F (37.2 °C)         PHYSICAL EXAM:    Constitutional: Well developed and nourished  Skin: + hives  HEENT: Supple neck  Cardiovascular: Regular rate and rhythm  Respiratory: Symmetric expansion  GI: Soft, ND  Neurological: Alert with normal speech.   Psychiatric: Normal mood and affect    Data Review    Recent Results (from the past 24 hour(s))   SED RATE (ESR)    Collection Time: 23  5:55 PM   Result Value Ref Range    Sed rate, automated 107 (H) 0 - 20 mm/hr   TSH 3RD GENERATION    Collection Time: 23  5:55 PM   Result Value Ref Range    TSH 0.34 (L) 0.36 - 3.68 uIU/mL   METABOLIC PANEL, BASIC    Collection Time: 23  5:55 PM   Result Value Ref Range    Sodium 135 (L) 136 - 145 mmol/L    Potassium 3.9 3.5 - 5.1 mmol/L    Chloride 107 97 - 108 mmol/L    CO2 23 21 - 32 mmol/L    Anion gap 5 5 - 15 mmol/L    Glucose 157 (H) 65 - 100 mg/dL    BUN 21 (H) 6 - 20 mg/dL    Creatinine 1.17 0.70 - 1.30 mg/dL    BUN/Creatinine ratio 18 12 - 20      eGFR 60 (L) >60 ml/min/1.73m2    Calcium 9.3 8.5 - 10.1 mg/dL       XR CHEST PORT   Final Result   No acute process. MRI BRAIN WO CONT   Final Result      1. Few tiny acute cortical infarcts in the left frontal lobe, and additional   tiny acute infarcts in the left parietal lobe and left external capsule. 2. Mild chronic microvascular ischemic disease. Chronic infarct in the right   frontal lobe, and additional small chronic infarcts in the left posterior basal   ganglia and bilateral cerebellum. CTA CODE NEURO HEAD AND NECK W CONT   Final Result      CTA    1. CTA neck demonstrates no large vessel occlusion, high-grade stenosis or   aneurysm. 2. CTA head demonstrates no large vessel occlusion, high-grade stenosis or   aneurysm. 3. No intracranial mass or enhancing lesion. Chronic right MCA territory   infarct. Others:     . Chronic maxillary sinusitis. CT CODE NEURO HEAD WO CONTRAST   Final Result   1. No acute intracranial abnormality. 2. 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 3. Left maxillary sinusitis                  Assessment/Plan:     Acute CVA with gait disturbance  - CTA head and neck shows no large vessel occlusion or stenosis  - MRI of brain showing few tiny acute cortical infarcts in the left frontal lobe and additional tiny acute infarcts in left parietal lobe and left external capsule.   - Tele-neuro input appreciated  - ASA and Plavix x 21 days  - Neuro consult pending   - SLP/PT/OT on case  - Echocardiogram  ok   - Cardiology  on case  -Needs cardiac event/Holter monitor at discharge per tele-neurology       HTN- monitor BP on  amlodipine  Hives- S/t Ativan suspected allergic reaction,  Continue steroids and PRN benadryl   Low TSH- check FT4    DVT Prophylaxis:  Code Status: Full Code  POA/NOK: Wife  Social determinants of health-none      Disposition and discharge barriers: needs cardiac event monitor     Maikel Rodríguez MD

## 2023-03-07 NOTE — PROGRESS NOTES
CARDIOLOGY CONSULTATION      REASON FOR CONSULT: CVA    REQUESTING PROVIDER: Hospitalist     CHIEF COMPLAINT:  AMS    HISTORY OF PRESENT ILLNESS:  Edgar Adair is a 80y.o. year-old male with past medical history significant for TIAs, HTN, HLD, dementia p/w AMS. He has been having gait instability and and memory issues for past few weeks. In ER he was hypertensive. CTA did not show any acute vessel occlusions or stenosis. MRI showed old infarcts. 3/7/23: Doing much better today. In bed eating, denies any CP, SOB. INPATIENT MEDICATIONS:  Home medications reviewed. Current Facility-Administered Medications:     predniSONE (DELTASONE) tablet 20 mg, 20 mg, Oral, BID WITH MEALS, Maikel Horner MD    [START ON 3/8/2023] amLODIPine (NORVASC) tablet 5 mg, 5 mg, Oral, DAILY, Maikel Horner MD    maalox/viscous lidocaine/diphenhydramine (GI COCKTAIL) oral solution 30 mL, 30 mL, Oral, ONCE, Maikel Horner MD    clopidogreL (PLAVIX) tablet 75 mg, 75 mg, Oral, DAILY, Uzma Mcmullen MD, 75 mg at 03/07/23 8847    diphenhydrAMINE (BENADRYL) capsule 25 mg, 25 mg, Oral, Q6H PRN, Deborah Ricardo PA-C, 25 mg at 03/06/23 1734    OLANZapine (ZyPREXA) 2.5 mg in sterile water (preservative free) 0.5 mL injection, 2.5 mg, IntraMUSCular, Q4H PRN, Hayden Boothe MD    aspirin chewable tablet 81 mg, 81 mg, Oral, DAILY, Faith Ovalle MD, 81 mg at 03/07/23 9050    acetaminophen (TYLENOL) tablet 650 mg, 650 mg, Oral, Q4H PRN **OR** acetaminophen (TYLENOL) solution 650 mg, 650 mg, Per NG tube, Q4H PRN **OR** acetaminophen (TYLENOL) suppository 650 mg, 650 mg, Rectal, Q4H PRN, Faith Ovalle MD     ALLERGIES:  Allergies reviewed with the patient,  Allergies   Allergen Reactions    Hydrocodone Other (comments)     Side effects in form of balance problem    Sulfa (Sulfonamide Antibiotics) Rash    Ativan [Lorazepam] Itching    . FAMILY HISTORY:  Family history reviewed.        SOCIAL HISTORY:  Notable for No  tobacco use, no heavy alcohol or illicit drug use. REVIEW OF SYSTEMS:  Complete review of systems performed, pertinents noted above, all other systems are negative. PHYSICAL EXAMINATION:   Cardiovascular exam has a heart with a RRR. No murmurs, normal S1 and S2. No murmur present. There are no rubs or gallops. Good peripheral pulses. No jugular venous distension. No carotid bruits are present. Respiratory exam reveals clear lung fields, no rales or rhonchi. Gastrointestinal exam has soft, nontender abdomen with normal bowel sounds. Lymphatic exam reveals no edema and no varicosities. No notable skin changes. Patient is acutely altered, wearing Mitts. Visit Vitals  BP (!) 142/80 (BP 1 Location: Left upper arm, BP Patient Position: At rest;Supine)   Pulse 75   Temp 97.4 °F (36.3 °C)   Resp 18   Ht 5' 9\" (1.753 m)   Wt 76.2 kg (168 lb)   SpO2 100%   BMI 24.81 kg/m²         Recent labs results and imaging reviewed. Notable findings include   Lab Results   Component Value Date/Time    WBC 6.7 03/05/2023 05:09 PM    HGB 14.8 03/05/2023 05:09 PM    HCT 43.8 03/05/2023 05:09 PM    PLATELET 900 58/40/1107 05:09 PM    MCV 91.3 03/05/2023 05:09 PM     Lab Results   Component Value Date/Time    Glucose 157 (H) 03/06/2023 05:55 PM    Microalb/Creat ratio (ug/mg creat.) 56 (H) 06/27/2022 01:11 PM    LDL, calculated 108 (H) 06/27/2022 01:11 PM    Creatinine 1.17 03/06/2023 05:55 PM      No results found for: CPK, RCK1, RCK2, RCK3, RCK4, CKMB, CKNDX, CKND1, TROPT, TROIQ, BNPP, BNP  . 1) TIA: Neurology following.   - Echocardiogram with normal EF. No severe valve issues. - Agree with ASA and plavix   - Neuro would like a AMANDA. Will order it. 2) HTN: Controlled  - C/w amlodipine 5mg QD    Thank you for involving us in the care of this patient. Please call with questions.    Elham Jasmine MD  3/7/2023

## 2023-03-07 NOTE — PROGRESS NOTES
Patient is continuously trying to get out of bed, when assisted back into bed patient becomes increasingly agitated and combative with staff. Patient is trying to hit, kick and bite staff. Telemetry keeps coming off due to patient inability to stay still in bed. On call doctor notified of patient status, PRN zyprexa ordered IM for agitation.

## 2023-03-07 NOTE — CONSULTS
NEURO CONSULT      REASON FOR ADMISSION:  Embolic stroke,  Mental status changes      HISTORY:  Mr. Ute Mcdonald is 80years old with a history of cardiac arrhythmia, arthritis, headache, dyslipidemia, hypertension, prostate disease, who is consulted to neurology for having had some weakness and confusion while at home. Reports that that patient was not interested in coming to the hospital but eventually was convinced to come. He was admitted to the ED. In the ED patient had a head CT without contrast that is showing shower emboli. Left frontal and parietal regions. Patient was stabilized and subsequently admitted. He is still truculant and wants to go home.     ROS: As per above plus more    General:                     No fever, no chills, no sweats, no generalized weakness, no weight loss/gain,                                       No loss of appetite   Eyes:                           No blurred vision, no eye pain, no loss of vision, no double vision  ENT:                            rhinorrhea, no pharyngitis   Respiratory:               No cough, no sputum production, no SOB, no HERNANDES, no wheezing, no pleuritic pain   Cardiology:                No chest pain, no palpitations, no orthopnea, no PND, no edema, no syncope   Gastrointestinal:       No abdominal pain , no N/V, no diarrhea, no dysphagia, no constipation, no bleeding   Genitourinary:           frequency, no urgency, no dysuria, no hematuria, no incontinence   Muskuloskeletal :      No arthralgia, no myalgia, no back pain  Hematology:              No easy bruising, no nose or gum bleeding, no lymphadenopathy   Dermatological:         No rash, no ulceration, no pruritis, no color change / jaundice  Endocrine:                 hot flashes or polydipsia   Neurological:             No headache, no dizziness, no confusion, no focal weakness, no paresthesia,                                      No Speech difficulties, no memory loss, no gait difficulty  Psychological:          No neelings of anxiety, no depression, no agitation      NEURO EXAM:    Mental status: Awake, somewhat hard of hearing, responds verbally though not optimally. Patient still wants to go home. He follows one-step commands with coaxing.     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  Slight mental status changes      PLAN:  AMANDA  Antiplatelet agent  EEG  Carotid duplex  Cardiac echo  PT/OT consult  Patient seen, evaluated and treated total amount of time 42 minutes      ALLERGIES:    Allergies   Allergen Reactions    Hydrocodone Other (comments)     Side effects in form of balance problem    Sulfa (Sulfonamide Antibiotics) Rash    Ativan [Lorazepam] Itching       MEDS:      Current Facility-Administered Medications:     predniSONE (DELTASONE) tablet 20 mg, 20 mg, Oral, BID WITH MEALS, Maikel Horner MD    [START ON 3/8/2023] amLODIPine (NORVASC) tablet 5 mg, 5 mg, Oral, DAILY, Maikel Horner MD    maalox/viscous lidocaine/diphenhydramine (GI COCKTAIL) oral solution 30 mL, 30 mL, Oral, ONCE, Maikel Horner MD    clopidogreL (PLAVIX) tablet 75 mg, 75 mg, Oral, DAILY, Melody Mandujano MD, 75 mg at 03/07/23 1134    diphenhydrAMINE (BENADRYL) capsule 25 mg, 25 mg, Oral, Q6H PRN, Jeannette Foy PA-C, 25 mg at 03/06/23 1734    OLANZapine (ZyPREXA) 2.5 mg in sterile water (preservative free) 0.5 mL injection, 2.5 mg, IntraMUSCular, Q4H PRN, Neyda Boothe MD    aspirin chewable tablet 81 mg, 81 mg, Oral, DAILY, Sophie Bence, MD, 81 mg at 03/07/23 0805    acetaminophen (TYLENOL) tablet 650 mg, 650 mg, Oral, Q4H PRN **OR** acetaminophen (TYLENOL) solution 650 mg, 650 mg, Per NG tube, Q4H PRN **OR** acetaminophen (TYLENOL) suppository 650 mg, 650 mg, Rectal, Q4H PRN, Kade Lpoez MD    LABS:  Recent Results (from the past 24 hour(s))   SED RATE (ESR)    Collection Time: 03/06/23  5:55 PM   Result Value Ref Range    Sed rate, automated 107 (H) 0 - 20 mm/hr   TSH 3RD GENERATION    Collection Time: 03/06/23  5:55 PM   Result Value Ref Range    TSH 0.34 (L) 0.36 - 3.06 uIU/mL   METABOLIC PANEL, BASIC    Collection Time: 03/06/23  5:55 PM   Result Value Ref Range    Sodium 135 (L) 136 - 145 mmol/L    Potassium 3.9 3.5 - 5.1 mmol/L    Chloride 107 97 - 108 mmol/L    CO2 23 21 - 32 mmol/L    Anion gap 5 5 - 15 mmol/L    Glucose 157 (H) 65 - 100 mg/dL    BUN 21 (H) 6 - 20 mg/dL    Creatinine 1.17 0.70 - 1.30 mg/dL    BUN/Creatinine ratio 18 12 - 20      eGFR 60 (L) >60 ml/min/1.73m2    Calcium 9.3 8.5 - 10.1 mg/dL       Visit Vitals  BP (!) 142/80 (BP 1 Location: Left upper arm, BP Patient Position: At rest;Supine)   Pulse 75   Temp 97.4 °F (36.3 °C)   Resp 18   Ht 5' 9\" (1.753 m)   Wt 76.2 kg (168 lb)   SpO2 100%   BMI 24.81 kg/m²       Imaging:  XR CHEST PORT   Final Result   No acute process. MRI BRAIN WO CONT   Final Result      1. Few tiny acute cortical infarcts in the left frontal lobe, and additional   tiny acute infarcts in the left parietal lobe and left external capsule. 2. Mild chronic microvascular ischemic disease. Chronic infarct in the right   frontal lobe, and additional small chronic infarcts in the left posterior basal   ganglia and bilateral cerebellum. CTA CODE NEURO HEAD AND NECK W CONT   Final Result      CTA    1. CTA neck demonstrates no large vessel occlusion, high-grade stenosis or   aneurysm. 2. CTA head demonstrates no large vessel occlusion, high-grade stenosis or   aneurysm. 3. No intracranial mass or enhancing lesion. Chronic right MCA territory   infarct. Others:     . Chronic maxillary sinusitis. CT CODE NEURO HEAD WO CONTRAST   Final Result   1.  No acute intracranial abnormality. 2. 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    3. Left maxillary sinusitis

## 2023-03-08 ENCOUNTER — APPOINTMENT (OUTPATIENT)
Dept: NON INVASIVE DIAGNOSTICS | Age: 88
DRG: 066 | End: 2023-03-08
Attending: STUDENT IN AN ORGANIZED HEALTH CARE EDUCATION/TRAINING PROGRAM
Payer: MEDICARE

## 2023-03-08 ENCOUNTER — ANESTHESIA (OUTPATIENT)
Dept: NON INVASIVE DIAGNOSTICS | Age: 88
DRG: 066 | End: 2023-03-08
Payer: MEDICARE

## 2023-03-08 LAB
CHOLEST SERPL-MCNC: 194 MG/DL
CRP SERPL HS-MCNC: 5 MG/L
EST. AVERAGE GLUCOSE BLD GHB EST-MCNC: 103 MG/DL
HBA1C MFR BLD: 5.2 % (ref 4–5.6)
HDLC SERPL-MCNC: 63 MG/DL
HDLC SERPL: 3.1 (ref 0–5)
LDLC SERPL CALC-MCNC: 122.4 MG/DL (ref 0–100)
LIPID PROFILE,FLP: ABNORMAL
T4 FREE SERPL-MCNC: 1 NG/DL (ref 0.8–1.5)
TRIGL SERPL-MCNC: 43 MG/DL (ref ?–150)
VLDLC SERPL CALC-MCNC: 8.6 MG/DL

## 2023-03-08 PROCEDURE — 74011636637 HC RX REV CODE- 636/637: Performed by: HOSPITALIST

## 2023-03-08 PROCEDURE — 74011250637 HC RX REV CODE- 250/637: Performed by: HOSPITALIST

## 2023-03-08 PROCEDURE — 93312 ECHO TRANSESOPHAGEAL: CPT

## 2023-03-08 PROCEDURE — A9270 NON-COVERED ITEM OR SERVICE: HCPCS | Performed by: HOSPITALIST

## 2023-03-08 PROCEDURE — G0378 HOSPITAL OBSERVATION PER HR: HCPCS

## 2023-03-08 PROCEDURE — 74011250637 HC RX REV CODE- 250/637: Performed by: INTERNAL MEDICINE

## 2023-03-08 PROCEDURE — 74011000250 HC RX REV CODE- 250: Performed by: NURSE ANESTHETIST, CERTIFIED REGISTERED

## 2023-03-08 PROCEDURE — 74011250636 HC RX REV CODE- 250/636: Performed by: NURSE ANESTHETIST, CERTIFIED REGISTERED

## 2023-03-08 RX ORDER — METOPROLOL TARTRATE 25 MG/1
12.5 TABLET, FILM COATED ORAL EVERY 12 HOURS
Status: DISCONTINUED | OUTPATIENT
Start: 2023-03-08 | End: 2023-03-09 | Stop reason: HOSPADM

## 2023-03-08 RX ORDER — PROPOFOL 10 MG/ML
INJECTION, EMULSION INTRAVENOUS AS NEEDED
Status: DISCONTINUED | OUTPATIENT
Start: 2023-03-08 | End: 2023-03-08 | Stop reason: HOSPADM

## 2023-03-08 RX ORDER — AMLODIPINE BESYLATE 5 MG/1
10 TABLET ORAL DAILY
Status: DISCONTINUED | OUTPATIENT
Start: 2023-03-09 | End: 2023-03-09 | Stop reason: HOSPADM

## 2023-03-08 RX ORDER — PREDNISONE 5 MG/1
15 TABLET ORAL 2 TIMES DAILY WITH MEALS
Status: DISCONTINUED | OUTPATIENT
Start: 2023-03-08 | End: 2023-03-09 | Stop reason: HOSPADM

## 2023-03-08 RX ORDER — LIDOCAINE HYDROCHLORIDE 20 MG/ML
INJECTION, SOLUTION EPIDURAL; INFILTRATION; INTRACAUDAL; PERINEURAL AS NEEDED
Status: DISCONTINUED | OUTPATIENT
Start: 2023-03-08 | End: 2023-03-08 | Stop reason: HOSPADM

## 2023-03-08 RX ORDER — SODIUM CHLORIDE 9 MG/ML
INJECTION, SOLUTION INTRAVENOUS
Status: DISCONTINUED | OUTPATIENT
Start: 2023-03-08 | End: 2023-03-08 | Stop reason: HOSPADM

## 2023-03-08 RX ORDER — HYDRALAZINE HYDROCHLORIDE 20 MG/ML
20 INJECTION INTRAMUSCULAR; INTRAVENOUS
Status: DISCONTINUED | OUTPATIENT
Start: 2023-03-08 | End: 2023-03-09 | Stop reason: HOSPADM

## 2023-03-08 RX ADMIN — LIDOCAINE HYDROCHLORIDE 40 MG: 20 INJECTION, SOLUTION EPIDURAL; INFILTRATION; INTRACAUDAL; PERINEURAL at 13:54

## 2023-03-08 RX ADMIN — CLOPIDOGREL BISULFATE 75 MG: 75 TABLET ORAL at 10:20

## 2023-03-08 RX ADMIN — METOPROLOL TARTRATE 12.5 MG: 25 TABLET, FILM COATED ORAL at 12:44

## 2023-03-08 RX ADMIN — ASPIRIN 81 MG 81 MG: 81 TABLET ORAL at 10:20

## 2023-03-08 RX ADMIN — METOPROLOL TARTRATE 12.5 MG: 25 TABLET, FILM COATED ORAL at 21:49

## 2023-03-08 RX ADMIN — SODIUM CHLORIDE: 9 INJECTION, SOLUTION INTRAVENOUS at 13:54

## 2023-03-08 RX ADMIN — PROPOFOL 100 MG: 10 INJECTION, EMULSION INTRAVENOUS at 13:54

## 2023-03-08 RX ADMIN — PREDNISONE 15 MG: 5 TABLET ORAL at 17:39

## 2023-03-08 RX ADMIN — AMLODIPINE BESYLATE 5 MG: 5 TABLET ORAL at 10:20

## 2023-03-08 RX ADMIN — PREDNISONE 20 MG: 20 TABLET ORAL at 10:20

## 2023-03-08 NOTE — PROGRESS NOTES
CARDIOLOGY CONSULTATION      REASON FOR CONSULT: CVA    REQUESTING PROVIDER: Hospitalist     CHIEF COMPLAINT:  AMS    HISTORY OF PRESENT ILLNESS:  Rafa Yo is a 80y.o. year-old male with past medical history significant for TIAs, HTN, HLD, dementia p/w AMS. He has been having gait instability and and memory issues for past few weeks. In ER he was hypertensive. CTA did not show any acute vessel occlusions or stenosis. MRI showed old infarcts. 3/7/23: Doing much better today. In bed eating, denies any CP, SOB.    3/8/23: Doing well, joking around. BP elevated. No CP, SOB, palpitations. INPATIENT MEDICATIONS:  Home medications reviewed.     Current Facility-Administered Medications:     [START ON 3/9/2023] amLODIPine (NORVASC) tablet 10 mg, 10 mg, Oral, DAILY, Pallavi Brooks MD    hydrALAZINE (APRESOLINE) 20 mg/mL injection 20 mg, 20 mg, IntraVENous, Q6H PRN, Maikel Horner MD    predniSONE (DELTASONE) tablet 15 mg, 15 mg, Oral, BID WITH MEALS, Maikel Horner MD    metoprolol tartrate (LOPRESSOR) tablet 12.5 mg, 12.5 mg, Oral, Q12H, Maikel Horner MD, 12.5 mg at 03/08/23 1244    maalox/viscous lidocaine/diphenhydramine (GI COCKTAIL) oral solution 30 mL, 30 mL, Oral, TID PRN, Maikel Horner MD    clopidogreL (PLAVIX) tablet 75 mg, 75 mg, Oral, DAILY, Julio Sanches MD, 75 mg at 03/08/23 1020    diphenhydrAMINE (BENADRYL) capsule 25 mg, 25 mg, Oral, Q6H PRN, Mark Armenta PA-C, 25 mg at 03/06/23 1734    OLANZapine (ZyPREXA) 2.5 mg in sterile water (preservative free) 0.5 mL injection, 2.5 mg, IntraMUSCular, Q4H PRN, Nick Boothe MD    aspirin chewable tablet 81 mg, 81 mg, Oral, DAILY, Garrett Mejia MD, 81 mg at 03/08/23 1020    acetaminophen (TYLENOL) tablet 650 mg, 650 mg, Oral, Q4H PRN **OR** acetaminophen (TYLENOL) solution 650 mg, 650 mg, Per NG tube, Q4H PRN **OR** acetaminophen (TYLENOL) suppository 650 mg, 650 mg, Rectal, Q4H PRN, Garrett Mejia MD     ALLERGIES: Allergies reviewed with the patient,  Allergies   Allergen Reactions    Hydrocodone Other (comments)     Side effects in form of balance problem    Sulfa (Sulfonamide Antibiotics) Rash    Ativan [Lorazepam] Itching    . FAMILY HISTORY:  Family history reviewed. SOCIAL HISTORY:  Notable for No  tobacco use, no heavy alcohol or illicit drug use. REVIEW OF SYSTEMS:  Complete review of systems performed, pertinents noted above, all other systems are negative. PHYSICAL EXAMINATION:   Cardiovascular exam has a heart with a RRR. No murmurs, normal S1 and S2. No murmur present. There are no rubs or gallops. Good peripheral pulses. No jugular venous distension. No carotid bruits are present. Respiratory exam reveals clear lung fields, no rales or rhonchi. Gastrointestinal exam has soft, nontender abdomen with normal bowel sounds. Lymphatic exam reveals no edema and no varicosities. No notable skin changes. Patient is acutely altered, wearing Mitts. Visit Vitals  BP (!) 190/95   Pulse 62   Temp 98 °F (36.7 °C)   Resp 18   Ht 5' 9\" (1.753 m)   Wt 76.2 kg (168 lb)   SpO2 98%   BMI 24.81 kg/m²         Recent labs results and imaging reviewed. Notable findings include   Lab Results   Component Value Date/Time    WBC 6.7 03/05/2023 05:09 PM    HGB 14.8 03/05/2023 05:09 PM    HCT 43.8 03/05/2023 05:09 PM    PLATELET 676 40/21/2056 05:09 PM    MCV 91.3 03/05/2023 05:09 PM     Lab Results   Component Value Date/Time    Hemoglobin A1c 5.2 03/06/2023 05:55 PM    Glucose 157 (H) 03/06/2023 05:55 PM    Microalb/Creat ratio (ug/mg creat.) 56 (H) 06/27/2022 01:11 PM    LDL, calculated 122.4 (H) 03/06/2023 05:55 PM    Creatinine 1.17 03/06/2023 05:55 PM      No results found for: CPK, RCK1, RCK2, RCK3, RCK4, CKMB, CKNDX, CKND1, TROPT, TROIQ, BNPP, BNP  . 1) TIA: Neurology following.   - Echocardiogram with normal EF. No severe valve issues.    - Agree with ASA and plavix   - AMANDA today    2) HTN: Elevated   - I increased Amlodipine to 10mg every day     Thank you for involving us in the care of this patient. Please call with questions.    Gaby Walker MD  3/8/2023

## 2023-03-08 NOTE — ANESTHESIA POSTPROCEDURE EVALUATION
* No procedures listed *.    total IV anesthesia    Anesthesia Post Evaluation        Patient location during evaluation: PACU  Patient participation: complete - patient participated  Level of consciousness: awake  Pain score: 0  Pain management: adequate  Airway patency: patent  Anesthetic complications: no  Cardiovascular status: acceptable  Respiratory status: acceptable  Hydration status: acceptable  Post anesthesia nausea and vomiting:  controlled  Final Post Anesthesia Temperature Assessment:  Normothermia (36.0-37.5 degrees C)      INITIAL Post-op Vital signs:   Vitals Value Taken Time   /88 03/08/23 1420   Temp 36.3 °C (97.4 °F) 03/08/23 1404   Pulse 68 03/08/23 1420   Resp 16 03/08/23 1404   SpO2 97 % 03/08/23 1420

## 2023-03-08 NOTE — PROGRESS NOTES
Per Dr Kianna Smith. Patient does not need Holter monitor on discharge. Patient will receive a 30 day monitor when he follows up in the office. Called to speak with DAMON Paulson voice mail left. [phone].

## 2023-03-08 NOTE — PROGRESS NOTES
OT tx session attempted at [phone], however pt off floor. Will continue to follow pt and attempt tx session at a later time as time allows. Thank you.

## 2023-03-08 NOTE — PROGRESS NOTES
NEURO PROGRESS NOTE        SUBJECTIVE:   Embolic strokes to the brain  Mental status changes      EXAM:  Awake, oriented, not aphasic, not as cantankerous today as he was yesterday  Follows commands  No new focality      ASSESSMENT/PLAN:  Work-up continues. AMANDA pending    ALLERGIES:    Allergies   Allergen Reactions    Hydrocodone Other (comments)     Side effects in form of balance problem    Sulfa (Sulfonamide Antibiotics) Rash    Ativan [Lorazepam] Itching       MEDS:      Current Facility-Administered Medications:     predniSONE (DELTASONE) tablet 20 mg, 20 mg, Oral, BID WITH MEALS, Maikel Horner MD, 20 mg at 03/07/23 1648    amLODIPine (NORVASC) tablet 5 mg, 5 mg, Oral, DAILY, Maikel Horner MD    maalox/viscous lidocaine/diphenhydramine (GI COCKTAIL) oral solution 30 mL, 30 mL, Oral, TID PRN, Maikel Horner MD    clopidogreL (PLAVIX) tablet 75 mg, 75 mg, Oral, DAILY, Huber Tellez MD, 75 mg at 03/07/23 5671    diphenhydrAMINE (BENADRYL) capsule 25 mg, 25 mg, Oral, Q6H PRN, Peng Hagan PA-C, 25 mg at 03/06/23 1734    OLANZapine (ZyPREXA) 2.5 mg in sterile water (preservative free) 0.5 mL injection, 2.5 mg, IntraMUSCular, Q4H PRN, Kari Odom MD    aspirin chewable tablet 81 mg, 81 mg, Oral, DAILY, Cyndi Abad MD, 81 mg at 03/07/23 0278    acetaminophen (TYLENOL) tablet 650 mg, 650 mg, Oral, Q4H PRN **OR** acetaminophen (TYLENOL) solution 650 mg, 650 mg, Per NG tube, Q4H PRN **OR** acetaminophen (TYLENOL) suppository 650 mg, 650 mg, Rectal, Q4H PRN, Cyndi Abad MD    LABS:  No results found for this or any previous visit (from the past 24 hour(s)). Visit Vitals  BP (!) 188/99   Pulse 81   Temp 98.5 °F (36.9 °C)   Resp 17   Ht 5' 9\" (1.753 m)   Wt 76.2 kg (168 lb)   SpO2 98%   BMI 24.81 kg/m²       Imaging:  XR CHEST PORT   Final Result   No acute process. MRI BRAIN WO CONT   Final Result      1.  Few tiny acute cortical infarcts in the left frontal lobe, and additional tiny acute infarcts in the left parietal lobe and left external capsule. 2. Mild chronic microvascular ischemic disease. Chronic infarct in the right   frontal lobe, and additional small chronic infarcts in the left posterior basal   ganglia and bilateral cerebellum. CTA CODE NEURO HEAD AND NECK W CONT   Final Result      CTA    1. CTA neck demonstrates no large vessel occlusion, high-grade stenosis or   aneurysm. 2. CTA head demonstrates no large vessel occlusion, high-grade stenosis or   aneurysm. 3. No intracranial mass or enhancing lesion. Chronic right MCA territory   infarct. Others:     . Chronic maxillary sinusitis. CT CODE NEURO HEAD WO CONTRAST   Final Result   1. No acute intracranial abnormality. 2. 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    3. Left maxillary sinusitis

## 2023-03-08 NOTE — PROGRESS NOTES
Hospitalist Progress Note        Daily Progress Note: 3/8/2023    Hospital Course     Bridgette Dallas is  80 y.o. male with history of hypertension, hyperlipidemia, TIA, history of migraine headaches presents to the emergency room as his caregiver found him with multiple episodes of speech disturbance, then noticed that his gait is unsteady. He woke up this morning, his speech is very slurred unable to speak clearly, wife called caregiver who noticed it. It is resolved, and happening again, so wanted to come to the emergency room but patient refused. They always go for lunch at [address] [facility], at that time he found him not able to walk properly and very unsteady. He has to get him a golf cart to go around. Eventually presented to ED. In the ED, hypertensive otherwise hemodynamically stable. Significant labs showing BUN 27, creatinine 1.36. CTA head and neck shows no large vessel occlusion or stenosis. MRI of brain showing few tiny acute cortical infarcts in the left frontal lobe and additional tiny acute infarcts in left parietal lobe and left external capsule. Notes chronic infarct in right frontal lobe and additional small chronic infarcts in left posterior basal ganglia and bilateral cerebellum. Telemetry neurologist recommending to continue ASA and Plavix for 21 days. Also recommending echocardiogram and possible Holter monitor. Cardiology consulted. Patient agitated and confused in the ED. Given ativan, however he became itchy with hives on chest. Given benadryl and started on IV steroids. Subjective:   3/7/23:  Patient seen for the first time, chart was reviewed. Patient has sitter in the room. He was awake alert answered questions appropriately. MRI brain did show stroke on the left side of the brain. No other acute issues reported to me by patient or staff at this time. 3/8/23:  Patient seen and examined, chart was reviewed.   AMANDA pending  PT recommending IRF  Spoke with daughter in detail at bedside. BP intermittently elevated  No other acute issues reported to me by staff at this time      Objective:     Current Facility-Administered Medications   Medication Dose Route Frequency    [START ON 3/9/2023] amLODIPine (NORVASC) tablet 10 mg  10 mg Oral DAILY    hydrALAZINE (APRESOLINE) 20 mg/mL injection 20 mg  20 mg IntraVENous Q6H PRN    predniSONE (DELTASONE) tablet 15 mg  15 mg Oral BID WITH MEALS    metoprolol tartrate (LOPRESSOR) tablet 12.5 mg  12.5 mg Oral Q12H    maalox/viscous lidocaine/diphenhydramine (GI COCKTAIL) oral solution 30 mL  30 mL Oral TID PRN    clopidogreL (PLAVIX) tablet 75 mg  75 mg Oral DAILY    diphenhydrAMINE (BENADRYL) capsule 25 mg  25 mg Oral Q6H PRN    OLANZapine (ZyPREXA) 2.5 mg in sterile water (preservative free) 0.5 mL injection  2.5 mg IntraMUSCular Q4H PRN    aspirin chewable tablet 81 mg  81 mg Oral DAILY    acetaminophen (TYLENOL) tablet 650 mg  650 mg Oral Q4H PRN    Or    acetaminophen (TYLENOL) solution 650 mg  650 mg Per NG tube Q4H PRN    Or    acetaminophen (TYLENOL) suppository 650 mg  650 mg Rectal Q4H PRN       Visit Vitals  BP (!) 190/95   Pulse 62   Temp 98 °F (36.7 °C)   Resp 18   Ht 5' 9\" (1.753 m)   Wt 76.2 kg (168 lb)   SpO2 98%   BMI 24.81 kg/m²      O2 Device: None (Room air)    Temp (24hrs), Av.2 °F (36.8 °C), Min:97.4 °F (36.3 °C), Max:98.7 °F (37.1 °C)         PHYSICAL EXAM:    Constitutional: Well developed and nourished  Skin: + hives  HEENT: Supple neck  Cardiovascular: Regular rate and rhythm  Respiratory: Symmetric expansion  GI: Soft, ND  Neurological: Alert with normal speech. Psychiatric: Normal mood and affect    Data Review    No results found for this or any previous visit (from the past 24 hour(s)). XR CHEST PORT   Final Result   No acute process. MRI BRAIN WO CONT   Final Result      1.  Few tiny acute cortical infarcts in the left frontal lobe, and additional   tiny acute infarcts in the left parietal lobe and left external capsule. 2. Mild chronic microvascular ischemic disease. Chronic infarct in the right   frontal lobe, and additional small chronic infarcts in the left posterior basal   ganglia and bilateral cerebellum. CTA CODE NEURO HEAD AND NECK W CONT   Final Result      CTA    1. CTA neck demonstrates no large vessel occlusion, high-grade stenosis or   aneurysm. 2. CTA head demonstrates no large vessel occlusion, high-grade stenosis or   aneurysm. 3. No intracranial mass or enhancing lesion. Chronic right MCA territory   infarct. Others:     . Chronic maxillary sinusitis. CT CODE NEURO HEAD WO CONTRAST   Final Result   1. No acute intracranial abnormality. 2. 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 3. Left maxillary sinusitis                  Assessment/Plan:     Acute CVA with gait disturbance  - CTA head and neck shows no large vessel occlusion or stenosis  - MRI of brain showing few tiny acute cortical infarcts in the left frontal lobe and additional tiny acute infarcts in left parietal lobe and left external capsule.  - Neurooon case  - ASA and Plavix x 21 days  - AMANDA pending  - SLP/PT/OT on case  - Echocardiogram  ok   - Cardiology  on case  -Needs cardiac event/Holter monitor at discharge per tele-neurology       HTN- monitor as BP elevated today, adjust meds.   Added Lopressor  Hives- S/t Ativan suspected allergic reaction,  Continue steroids and PRN benadryl   Low TSH- check FT4 pemding    DVT Prophylaxis:  Code Status: Full Code  POA/NOK: Wife  Social determinants of health-none      Disposition and discharge barriers: IRF per PT. needs cardiac event monitor   at d/c     Maikel Rodríguez MD

## 2023-03-08 NOTE — PROGRESS NOTES
1121:  CM reviewed chart and noted PT/OT recs for IRF. CM met with patient and family at bedside. Patient agreeable to IRF at this time. Choice letter signed for [facility] in [place]. Choice letter placed on chart and referral sent. CM will need order placed by cardiology for holter monitor if still needed at time of discharge. CVL nurse will place on patient and a friend/family member would need to bring back to the hospital after 24-48 hours depending on what the order from cardiologist states. CM will continue to follow. 1353:  CM received call from [place] in [address]. They have confirmed with cardiologist that patient will not need holter monitor at discharge.

## 2023-03-08 NOTE — ROUTINE PROCESS
Attempted PT 1054 however /99 and 190/95 at rest in supine, holding treatment at this time. Will continue to follow and attempt at a later time. Thank you.

## 2023-03-09 VITALS
WEIGHT: 168 LBS | TEMPERATURE: 97.5 F | HEIGHT: 69 IN | BODY MASS INDEX: 24.88 KG/M2 | RESPIRATION RATE: 18 BRPM | SYSTOLIC BLOOD PRESSURE: 179 MMHG | HEART RATE: 71 BPM | DIASTOLIC BLOOD PRESSURE: 90 MMHG | OXYGEN SATURATION: 97 %

## 2023-03-09 PROBLEM — I63.9 CVA (CEREBRAL VASCULAR ACCIDENT) (HCC): Status: ACTIVE | Noted: 2023-03-09

## 2023-03-09 PROCEDURE — 74011250637 HC RX REV CODE- 250/637: Performed by: HOSPITALIST

## 2023-03-09 PROCEDURE — 74011250637 HC RX REV CODE- 250/637: Performed by: INTERNAL MEDICINE

## 2023-03-09 PROCEDURE — 97530 THERAPEUTIC ACTIVITIES: CPT

## 2023-03-09 PROCEDURE — 74011250637 HC RX REV CODE- 250/637: Performed by: STUDENT IN AN ORGANIZED HEALTH CARE EDUCATION/TRAINING PROGRAM

## 2023-03-09 PROCEDURE — 65270000029 HC RM PRIVATE

## 2023-03-09 PROCEDURE — G0378 HOSPITAL OBSERVATION PER HR: HCPCS

## 2023-03-09 PROCEDURE — 74011636637 HC RX REV CODE- 636/637: Performed by: HOSPITALIST

## 2023-03-09 PROCEDURE — 74011250636 HC RX REV CODE- 250/636: Performed by: HOSPITALIST

## 2023-03-09 PROCEDURE — A9270 NON-COVERED ITEM OR SERVICE: HCPCS | Performed by: HOSPITALIST

## 2023-03-09 RX ORDER — METOPROLOL TARTRATE 25 MG/1
12.5 TABLET, FILM COATED ORAL EVERY 12 HOURS
Qty: 60 TABLET | Refills: 0 | Status: SHIPPED | OUTPATIENT
Start: 2023-03-09

## 2023-03-09 RX ORDER — PREDNISONE 5 MG/1
15 TABLET ORAL 2 TIMES DAILY WITH MEALS
Qty: 5 TABLET | Refills: 0 | Status: SHIPPED | OUTPATIENT
Start: 2023-03-09 | End: 2023-03-14 | Stop reason: ALTCHOICE

## 2023-03-09 RX ORDER — GUAIFENESIN 100 MG/5ML
81 LIQUID (ML) ORAL DAILY
Qty: 30 TABLET | Refills: 0 | Status: SHIPPED | OUTPATIENT
Start: 2023-03-10

## 2023-03-09 RX ORDER — ATORVASTATIN CALCIUM 40 MG/1
40 TABLET, FILM COATED ORAL DAILY
Qty: 30 TABLET | Refills: 0 | Status: SHIPPED | OUTPATIENT
Start: 2023-03-10

## 2023-03-09 RX ORDER — CLOPIDOGREL BISULFATE 75 MG/1
75 TABLET ORAL DAILY
Qty: 18 TABLET | Refills: 0 | Status: SHIPPED | OUTPATIENT
Start: 2023-03-10 | End: 2023-03-28

## 2023-03-09 RX ORDER — ACETAMINOPHEN 325 MG/1
650 TABLET ORAL
Qty: 60 TABLET | Refills: 0 | Status: SHIPPED | OUTPATIENT
Start: 2023-03-09

## 2023-03-09 RX ORDER — AMLODIPINE BESYLATE 10 MG/1
10 TABLET ORAL DAILY
Qty: 30 TABLET | Refills: 0 | Status: SHIPPED | OUTPATIENT
Start: 2023-03-10

## 2023-03-09 RX ORDER — ATORVASTATIN CALCIUM 40 MG/1
40 TABLET, FILM COATED ORAL DAILY
Status: DISCONTINUED | OUTPATIENT
Start: 2023-03-10 | End: 2023-03-09 | Stop reason: HOSPADM

## 2023-03-09 RX ADMIN — PREDNISONE 15 MG: 5 TABLET ORAL at 09:14

## 2023-03-09 RX ADMIN — HYDRALAZINE HYDROCHLORIDE 20 MG: 20 INJECTION, SOLUTION INTRAMUSCULAR; INTRAVENOUS at 03:39

## 2023-03-09 RX ADMIN — CLOPIDOGREL BISULFATE 75 MG: 75 TABLET ORAL at 09:14

## 2023-03-09 RX ADMIN — ASPIRIN 81 MG 81 MG: 81 TABLET ORAL at 09:15

## 2023-03-09 RX ADMIN — METOPROLOL TARTRATE 12.5 MG: 25 TABLET, FILM COATED ORAL at 09:15

## 2023-03-09 RX ADMIN — HYDRALAZINE HYDROCHLORIDE 20 MG: 20 INJECTION, SOLUTION INTRAMUSCULAR; INTRAVENOUS at 12:01

## 2023-03-09 RX ADMIN — AMLODIPINE BESYLATE 10 MG: 5 TABLET ORAL at 09:15

## 2023-03-09 NOTE — DISCHARGE SUMMARY
Hospitalist Discharge Summary     Patient ID:  Shannon Kevin  [id number]  55 y.o.  10/11/1933  3/5/2023    PCP on record: Eugenia Ochoa MD    Admit date: 3/5/2023  Discharge date and time: 3/9/2023    DISCHARGE DIAGNOSIS:    Cerebrovascular accident with gait disturbance/hypertension/hives/hyperlipidemia    CONSULTATIONS:  IP CONSULT TO CARDIOLOGY  IP CONSULT TO NEUROLOGY    Excerpted HPI from H&P of Patrice Babcock MD:  Shannon Kevin is  80 y.o. male with history of hypertension, hyperlipidemia, TIA, history of migraine headaches presents to the emergency room as his caregiver found him with multiple episodes of speech disturbance, then notices that his gait is unsteady. He woke up this morning, his speech is very slurred unable to speak clearly, wife called caregiver who noticed it. It is resolved, and happening again, so wanted to come to the emergency room but patient refused. They always go for lunch at [facility], at that time he found him not able to walk properly and very unsteady. He has to get him a golf cart to go around. And then he continued to notice the symptoms and talk to the friends and brought to the emergency room even the patient was still refusing. When I was examining this patient who I am known very well in my practice in the past and he remembers me states that he does not want to stay. States he does not want to get any treatment, that he is 42-year-old and it is okay to have problems. Finally I convinced him to stay but throughout the stay he was not happy staying, friend and caregiver stayed with him throughout the night. This patient and his wife were well-known for me in my practice in the past, they had only one daughter who they last.  Since then they had a kind of underlying depression with  life.   Both are very nice people, and try to help people.    ______________________________________________________________________  Lissy Fam SUMMARY/HOSPITAL COURSE:  for full details see H&P, daily progress notes, labs, consult notes. Patient was subsequently admitted to [facility] further evaluation as well as management, patient made a continuous telemetry monitoring, patient underwent MRI brain which showed concerns for several acute infarcts, patient was evaluated by neurology, started dual antiplatelet therapy as well as Lipitor, patient underwent transesophageal echo which showed no evidence of thrombus, patient was eval by physical therapy as well as Occupational Therapy following which patient was deemed stable for discharge home with home health (patient was recommended for DC to IRF however patient refused) with close outpatient follow-up with primary care physician as well as neurology. _______________________________________________________________________  Patient seen and examined by me on discharge day. Pertinent Findings:  Gen:    Not in distress  Chest: Clear lungs  CVS:   Regular rhythm, s1/s2 no m/r/g No edema  Abd:  Soft, not distended, not tender  Neuro:  Alert,   _______________________________________________________________________  DISCHARGE MEDICATIONS:   Current Discharge Medication List        START taking these medications    Details   acetaminophen (TYLENOL) 325 mg tablet Take 2 Tablets by mouth every four (4) hours as needed for Fever or Pain (For temp greater than or equal to 38.5 C or 101.3 F (Unless hepatic failure or contrindicated). Give first line for fever. ). Qty: 60 Tablet, Refills: 0  Start date: 3/9/2023      amLODIPine (NORVASC) 10 mg tablet Take 1 Tablet by mouth daily. Qty: 30 Tablet, Refills: 0  Start date: 3/10/2023      aspirin 81 mg chewable tablet Take 1 Tablet by mouth daily. Qty: 30 Tablet, Refills: 0  Start date: 3/10/2023      atorvastatin (LIPITOR) 40 mg tablet Take 1 Tablet by mouth daily.   Qty: 30 Tablet, Refills: 0  Start date: 3/10/2023      clopidogreL (PLAVIX) 75 mg tab Take 1 Tablet by mouth daily for 18 doses. Qty: 18 Tablet, Refills: 0  Start date: 3/10/2023, End date: 3/28/2023      metoprolol tartrate (LOPRESSOR) 25 mg tablet Take 0.5 Tablets by mouth every twelve (12) hours. Qty: 60 Tablet, Refills: 0  Start date: 3/9/2023      predniSONE (DELTASONE) 5 mg tablet Take 3 Tablets by mouth two (2) times daily (with meals). Qty: 5 Tablet, Refills: 0  Start date: 3/9/2023               Patient Follow Up Instructions: Activity: PT/OT Eval and Treat  Diet: Cardiac Diet  Wound Care: As directed    Follow-up with PCP/Neurology in 2 weeks.   Follow-up tests/labs As per above physicians  Follow-up Information       Follow up With Specialties Details Why [address] [facility] EMERGENCY DEPT Emergency Medicine  As needed, If symptoms worsen [address] [facility] Jhonatan Leslie MD Internal Medicine Physician Schedule an appointment as soon as possible for a visit   [address] [place] [address],[address] [place] [address]  [phone]      Rena Navarrete MD Neurology Schedule an appointment as soon as possible for a visit   [address] [place] [address]  [phone]      Rosa Leslie MD Internal Medicine Physician Follow up in 2 week(s)  [address] [facility] Chichi Leslie MD Internal Medicine Physician   [facility], [facility]  [phone]      Iesha Bass MD Neurology Follow up in 2 week(s)  [address] [place] [address] [facility]  [phone]            ________________________________________________________________    Risk of deterioration: Low    Condition at Discharge:  Stable  __________________________________________________________________    Disposition  Home with home health    ____________________________________________________________________    Code Status: Full Code  ___________________________________________________________________      Total time in minutes spent coordinating this discharge (includes going over instructions, follow-up, prescriptions, and preparing report for sign off to her PCP) :  45 minutes    Signed:   Janet Feliz MD

## 2023-03-09 NOTE — PROGRESS NOTES
Patient with a discharge order present. CM met with patient and family at bedside. Patient would like to be discharged home with home health. [facility] of choice obtained for [facility]. CM sent referral. Medicare pt has received, reviewed, and signed 2nd IM letter informing them of their right to appeal the discharge. Signed copied has been placed on pt bedside chart. Family will transport patient home today. Primary nurse notified.

## 2023-03-09 NOTE — PROGRESS NOTES
Problem: Mobility Impaired (Adult and Pediatric)  Goal: *Acute Goals and Plan of Care (Insert Text)  Description: FUNCTIONAL STATUS PRIOR TO ADMISSION: Patient was independent and active without use of DME. Patient was independent for basic and instrumental ADLs. HOME SUPPORT PRIOR TO ADMISSION: The patient lived with wife but did not require assist. Pt drove prior to admission though he reports 3-4 falls in the past 3 months    Physical Therapy Goals  Initiated 3/7/2023  Pt stated goal: to go home  Pt will be I with LE HEP in 7 days. Pt will perform bed mobility with mod I in 7 days. Pt will perform transfers with mod I in 7 days. Pt will amb  feet with LRAD safely with mod I in 7 days. Outcome: Progressing Towards Goal   PHYSICAL THERAPY TREATMENT  Patient: Michael Aguilera (89 y.o. male)  Date: 3/9/2023  Diagnosis: TIA (transient ischemic attack) [G45.9]  CVA (cerebral vascular accident) ([facility] [address] [I63.9] <principal problem not specified>      Precautions: In place during session: EKG/telemetry   Chart, physical therapy assessment, plan of care and goals were reviewed. ASSESSMENT  Patient continues with skilled PT services and is slowly progressing towards goals. Pt found semi supine with family and PCT at bedside upon PTA arrival, agreeable to session. (See below for objective details and assist levels). Overall pt tolerated session fair today with bed mobility and seated therex, limited by activity tolerance and endurance. Pt demo'd impulsive behavior this session, cued for pt to sit at EOB however pt performed supine>stand transfer, received extensive education on activity pacing, task sequencing and command following for safety. When pt performed stand poor balance noted in standing, able to follow command and return to sitting with good balance in static sitting.  Performed seated therex, increased trunk sway and posterior lean noted with activity, educated pt on control and form, poor carry over. BP supine 144/80. Sitting /100 and post seated therex 170/90, nursing notified. Pt left sitting EOB while eating with 1:1 in room. Will continue to benefit from skilled PT services, and will continue to progress as tolerated. Current Level of Function Impacting Discharge (mobility/balance): standing balance, impulsive behavior and confusion     Other factors to consider for discharge: x1 A for safety, confusion and PLOF         PLAN :  Patient continues to benefit from skilled intervention to address the above impairments. Continue treatment per established plan of care to address goals. Recommend with staff: Out of bed to chair for meals and Encourage HEP in prep for ADLs/mobility    Recommendation for discharge: (in order for the patient to meet his/her long term goals)  [facility]     This discharge recommendation:  Has been made in collaboration with the attending provider and/or case management    IF patient discharges home will need the following DME: to be determined (TBD)       SUBJECTIVE:   Patient stated I know you're trying to teach me the right way.     OBJECTIVE DATA SUMMARY:   Critical Behavior:  Neurologic State: Alert  Orientation Level: Oriented to person  Cognition: Impulsive, Impaired decision making, Poor safety awareness  Safety/Judgement: Decreased awareness of need for assistance, Decreased awareness of need for safety, Decreased insight into deficits, Decreased awareness of environment  Functional Mobility Training:  Bed Mobility:  Rolling: Supervision  Supine to Sit: Supervision  Scooting: Supervision  Transfers:  Balance:  Sitting: Impaired; Without support  Sitting - Static: Good (unsupported)  Sitting - Dynamic: Fair (occasional)  Standing: Impaired; With support  Standing - Static: Poor  Ambulation/Gait Training:    Therapeutic Exercises:       EXERCISE   Sets   Reps   Active Active Assist   Passive Self ROM   Comments   Ankle Pumps 1 10 [x] [] [] []    Quad Sets/Glut Sets   [] [] [] []    Hamstring Sets   [] [] [] []    Short Arc Quads   [] [] [] []    Heel Slides   [] [] [] []    Straight Leg Raises   [] [] [] []    Hip abd/add   [] [] [] []    Long Arc Quads 10 10 [x] [] [] []    Marching 1 10 [x] [] [] []       [] [] [] []       Pain Rating:  Pt denies pain, family advised headache prior to session     Activity Tolerance:   Fair and requires frequent rest breaks    After treatment patient left in no apparent distress:   Bed locked and returned to lowest position, Call bell within reach, Caregiver / family present, Side rails x 3, and sitting EOB     COMMUNICATION/COLLABORATION:   The patients plan of care was discussed with: Registered nurse and Certified nursing assistant/patient care technician.      Ari Kevin PTA, PT   Time Calculation: 19 mins

## 2023-03-10 ENCOUNTER — TELEPHONE (OUTPATIENT)
Dept: INTERNAL MEDICINE CLINIC | Age: 88
End: 2023-03-10

## 2023-03-10 ENCOUNTER — PATIENT OUTREACH (OUTPATIENT)
Dept: CASE MANAGEMENT | Age: 88
End: 2023-03-10

## 2023-03-10 NOTE — PROGRESS NOTES
Care Transitions Initial Call    Call within 2 business days of discharge: Yes     Patient Current Location: [place]    Patient: Indira Holbrook Patient : 10/11/1933 MRN: [id number]    Last Discharge [address]       Date Complaint Diagnosis Description Type Department Provider    3/5/23 Dysarthria TIA (transient ischemic attack) ED to Hosp-Admission (Discharged) (ADMIT) Noe Martinez MD; S... Was this an external facility discharge? No     Challenges to be reviewed by the provider   Additional needs identified to be addressed with provider:   Has not picked up new Rx yet  Urged spouse to monitor BP at least daily           Method of communication with provider : none    Discussed COVID-19 related testing which was not done at this time. Advance Care Planning:   Does patient have an Advance Directive:  plan to discuss on next outreach . Inpatient Readmission Risk score: Unplanned Readmit Risk Score: 11.4    Was this a readmission? no   Patient stated reason for the admission: slurred speech    Patients top risk factors for readmission: ineffective coping, lack of knowledge about disease, level of motivation, medical condition-TIA, and medication management   Interventions to address risk factors: Scheduled appointment with PCP-spouse will arrange for earlier appt, Scheduled appointment with Specialist-3/16, Obtained and reviewed discharge summary and/or continuity of care documents, and Education of patient/family/caregiver/guardian to support self-management-monitor BP    Care Transition Nurse (CTN) contacted the  spouse, Farhad Guzman  by telephone to perform post hospital discharge assessment. Verified name and  with family as identifiers. Provided introduction to self, and explanation of the CTN role. CTN reviewed discharge instructions, medical action plan and red flags with family who verbalized understanding. Were discharge instructions available to patient? yes. Reviewed appropriate site of care based on symptoms and resources available to patient including: PCP, Specialist, and Home Health. Family given an opportunity to ask questions and does not have any further questions or concerns at this time. The family agrees to contact the PCP office for questions related to their healthcare. Medication reconciliation was performed with family, who verbalizes understanding of administration of home medications. Referral to Pharm D needed: no     Home Health/Outpatient orders at discharge: home health care, PT, OT, and [address]: [facility]  Date of initial visit: 3/10/2023    Durable Medical Equipment ordered at discharge: None    Was patient discharged with a pulse oximeter? no    Discussed follow-up appointments. If no appointment was previously scheduled, appointment scheduling offered: yes. Contacted Mallika at [facility], reports they are \"completely booked\" She states she will reach out to the patient directly. CTN updated the spouse. Is follow up appointment scheduled within 7 days of discharge? no.   [facility] follow up appointment(s):   Future Appointments   Date Time Provider Destiny Linton   4/12/2023  8:30 AM Carola Rader MD RSIP BS [facility]     Non-BS follow up appointment(s): 3/16 Dr. Daisy Tomlinson (neuro)    Plan for follow-up call in 5-7 days based on severity of symptoms and risk factors. Plan for next call: medication management-compliance  CTN provided contact information for future needs. Goals Addressed                   This Visit's Progress     Prevent complications post hospitalization.           03/10/23  Absence of falls  Teach back method used for BEFAST acronym  Will participate in [facility] PT to improve strength and mobility  Will attend fu appt with neurology scheduled 3/16  Waiting return call from PCP office for earlier appt  Will perform home BP monitoring with tracking

## 2023-03-14 ENCOUNTER — OFFICE VISIT (OUTPATIENT)
Dept: INTERNAL MEDICINE CLINIC | Age: 88
End: 2023-03-14
Payer: MEDICARE

## 2023-03-14 VITALS
HEIGHT: 69 IN | OXYGEN SATURATION: 97 % | HEART RATE: 55 BPM | RESPIRATION RATE: 18 BRPM | WEIGHT: 167.2 LBS | TEMPERATURE: 98 F | BODY MASS INDEX: 24.76 KG/M2 | SYSTOLIC BLOOD PRESSURE: 128 MMHG | DIASTOLIC BLOOD PRESSURE: 72 MMHG

## 2023-03-14 DIAGNOSIS — Z86.73 CEREBRAL INFARCTION, CHRONIC: ICD-10-CM

## 2023-03-14 DIAGNOSIS — R79.89 LOW TSH LEVEL: ICD-10-CM

## 2023-03-14 DIAGNOSIS — I63.511 CEREBRAL INFARCTION DUE TO OCCLUSION OF RIGHT MIDDLE CEREBRAL ARTERY (HCC): ICD-10-CM

## 2023-03-14 DIAGNOSIS — Z86.69 HISTORY OF MIGRAINE: ICD-10-CM

## 2023-03-14 DIAGNOSIS — I10 ESSENTIAL HYPERTENSION: ICD-10-CM

## 2023-03-14 DIAGNOSIS — E78.00 PURE HYPERCHOLESTEROLEMIA: ICD-10-CM

## 2023-03-14 DIAGNOSIS — R68.89 FORGETFULNESS: ICD-10-CM

## 2023-03-14 DIAGNOSIS — Z09 HOSPITAL DISCHARGE FOLLOW-UP: ICD-10-CM

## 2023-03-14 DIAGNOSIS — R26.9 GAIT DISTURBANCE: ICD-10-CM

## 2023-03-14 DIAGNOSIS — I63.9 CEREBROVASCULAR ACCIDENT (CVA), UNSPECIFIED MECHANISM (HCC): ICD-10-CM

## 2023-03-14 PROBLEM — I63.412 CEREBRAL INFARCTION DUE TO EMBOLISM OF LEFT MIDDLE CEREBRAL ARTERY (HCC): Status: ACTIVE | Noted: 2023-03-14

## 2023-03-14 PROCEDURE — G8427 DOCREV CUR MEDS BY ELIG CLIN: HCPCS | Performed by: INTERNAL MEDICINE

## 2023-03-14 PROCEDURE — 99496 TRANSJ CARE MGMT HIGH F2F 7D: CPT | Performed by: INTERNAL MEDICINE

## 2023-03-14 NOTE — PROGRESS NOTES
Praneeth Orellana (: 10/11/1933) is a 80 y.o. male, established patient, here for evaluation of the following chief complaint(s):  Hospital Follow Up         ASSESSMENT/PLAN:  Below is the assessment and plan developed based on review of pertinent history, physical exam, labs, studies, and medications. 1. Hospital discharge follow-up  -     CBC WITH AUTOMATED DIFF  -     METABOLIC PANEL, COMPREHENSIVE  2. Cerebrovascular accident (CVA), unspecified mechanism ([address]  3. Essential hypertension  -     CBC WITH AUTOMATED DIFF  -     METABOLIC PANEL, COMPREHENSIVE  4. Gait disturbance  5. Pure hypercholesterolemia  -     LIPID PANEL  6. Forgetfulness  7. 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 Cerebral infarction, chronic  -     LIPID PANEL  9. History of migraine  10. Low TSH level  -     TSH 3RD GENERATION  -     T4, [address] [place] discharge follow-up    Hospital admission 2023. Discharged on    Date 2023. Discharge diagnoses CVA with gait disturbance/hypertension/hyperlipidemia/right MCA infarct. He had right MCA infarct he has abnormal MRI with chronic infarct in right frontal lobe and left frontal and parietal lobe and basal ganglia. He has been started on      Amlodipine 10 mg once a day, metoprolol tartrate 25 mg twice a day. Today blood pressure is controlled diet low in sodium. He is going to get home health OT and PT twice a week from next week and    CVA without major neurological deficit he had speech disturbance but today his speech is much better. He is not using cane or walker and he does not like strongly recommended to use because it seems like he is still stumbling a little bit. He has a good support from his sister-in-law. His wife is injured has macular degeneration. He is getting help from friends.   Continued on clopidogrel 75 mg/day aspirin 81 mg/day going to see excellent neurologist Dr. Christina Howard on coming Thursday. He underwent MRI of the brain, CT brain, AMANDA. Diet low in saturated fat he eats at [facility] because he and his wife cannot cook at home. More vegetables known sugary fruits and continue atorvastatin 40 mg at bedtime. GERD now he is not on PPI. Low TSH repeat labs ordered in next 3 months. History of migraine currently no headache. According to the neurologist note he had some weakness and confusion at home and CT without contrast was showing some shower emboli in left frontal and parietal region he was stabilized he was reluctant to stay in the hospital.  CTA neck was unremarkable and CTA head was unremarkable except chronic maxillary sinusitis and he had microvascular ischemic all ischemic age-related changes and chronic infarct in the right frontal lobe and small chronic infarct in the left posterior basal ganglia and bilateral cerebellum tiny acute infarct in the left parietal lobe and left external capsule and few tiny acute cortical infarct in the left frontal lobe. Discussed in length about compliance of medications. All reports given. Follow-up with neurologist.  Continue OT PT and gait training and use assisting device. He had some forgetfulness I am not sure whether at this point he is certifiable to be on medications for memory problem it looks like it is due to vascular dementia. He is not wearing hearing aid machine. Return in about 3 months (around 6/14/2023). SUBJECTIVE/OBJECTIVE:  DEBRA Randall with pleasant personality came for follow-up after recent hospital discharge. He came with his wife and his sister-in-law and? Adopted daughter. Today his blood pressure is controlled. He was admitted on    Hospital admission 5 March 2023. Discharged on    9 March 2023. Discharge diagnoses CVA with gait disturbance/hypertension/hyperlipidemia/right MCA infarct.     Abnormal chronic MRI changes in the brain with chronic infarct in left frontal lobe, left parietal lobe, basal ganglia and right frontal lobe. He had speech disturbance and impaired gait today also he was not walking straight but he does not want to use cane or walker. He is going to get home health from [facility] twice a week for OT and PT. He has been on low-dose of aspirin, clopidogrel, statin. He has started taking blood pressure medicine. In the past he was noncompliant. He came with his wife who has macular degeneration and they both are elderly is but his sister-in-law came from [place]. Today he came with Ms. Minnie Blas, who says that she is adopted daughter for last few years. I saw her only first time today in the office. She wanted to know everything. I gave her copy of the CT scan and MRI result Echo results/AMANDA results. He has appointment with neurologist Dr. Zulema Perez on coming Thursday. He had undergone AMANDA which was unremarkable and he is not told to see cardiologist.  Today he is able to speak but he has forgetfulness. May be due to vascular dementia. I reviewed his labs and imagings and consult notes during his admission discussed in length with them. He needs to use assisting device that he is refusing. Marijuana but nothing cardio    Cardiogenic percolate current dressing    Allergies   Allergen Reactions    Hydrocodone Other (comments)     Side effects in form of balance problem    Sulfa (Sulfonamide Antibiotics) Rash    Ativan [Lorazepam] Itching     Current Outpatient Medications   Medication Sig    acetaminophen (TYLENOL) 325 mg tablet Take 2 Tablets by mouth every four (4) hours as needed for Fever or Pain (For temp greater than or equal to 38.5 C or 101.3 F (Unless hepatic failure or contrindicated). Give first line for fever. ). amLODIPine (NORVASC) 10 mg tablet Take 1 Tablet by mouth daily. aspirin 81 mg chewable tablet Take 1 Tablet by mouth daily. atorvastatin (LIPITOR) 40 mg tablet Take 1 Tablet by mouth daily. clopidogreL (PLAVIX) 75 mg tab Take 1 Tablet by mouth daily for 18 doses. metoprolol tartrate (LOPRESSOR) 25 mg tablet Take 0.5 Tablets by mouth every twelve (12) hours. No current facility-administered medications for this visit. Past Medical History:   Diagnosis Date    Arrhythmia     Arthritis     Calculus of kidney     GERD (gastroesophageal reflux disease)     Headache     Hypercholesterolemia     Hypertension     Prostate disease     TIA (transient ischemic attack) 03/05/2023     Past Surgical History:   Procedure Laterality Date    HX CHOLECYSTECTOMY      HX COLONOSCOPY      HX GI      HX PROSTATE SURGERY      prostate biopsy      Family History   Problem Relation Age of Onset    Heart Disease Mother     No Known Problems Father      Social History     Tobacco Use   Smoking Status Never   Smokeless Tobacco Never       Review of Systems   Constitutional: Negative. HENT:  Negative for sore throat. Eyes:  Negative for blurred vision. Respiratory:  Negative for cough, shortness of breath and wheezing. Cardiovascular: Negative. Gastrointestinal: Negative. Genitourinary: Negative. Musculoskeletal: Negative. Neurological:  Negative for dizziness, tingling, tremors, speech change and headaches. Endo/Heme/Allergies:  Negative for polydipsia. Does not bruise/bleed easily. Psychiatric/Behavioral: Negative. Vitals:    03/14/23 1504 03/14/23 1545   BP: 129/76 128/72   Pulse: (!) 55    Resp: 18    Temp: 98 °F (36.7 °C)    TempSrc: Oral    SpO2: 97%    Weight: 167 lb 3.2 oz (75.8 kg)    Height: 5' 9\" (1.753 m)           Physical Exam  Constitutional:       General: He is not in acute distress. Appearance: Normal appearance. He is not ill-appearing or toxic-appearing. HENT:      Mouth/Throat:      Pharynx: No oropharyngeal exudate or posterior oropharyngeal erythema. Eyes:      Pupils: Pupils are equal, round, and reactive to light.    Cardiovascular:      Rate and Rhythm: Normal rate and regular rhythm. Pulses: Normal pulses. Heart sounds: Normal heart sounds. No murmur heard. Pulmonary:      Effort: Pulmonary effort is normal.      Breath sounds: Normal breath sounds. No wheezing or rhonchi. Abdominal:      General: Bowel sounds are normal. There is no distension. Palpations: Abdomen is soft. There is no mass. Tenderness: There is no abdominal tenderness. There is no guarding. Musculoskeletal:         General: No swelling or tenderness. Cervical back: Neck supple. Right lower leg: No edema. Left lower leg: No edema. Neurological:      General: No focal deficit present. Mental Status: He is alert and oriented to person, place, and time. Mental status is at baseline. Cranial Nerves: No cranial nerve deficit. Gait: Gait abnormal.   Psychiatric:         Mood and Affect: Mood normal.         Behavior: Behavior normal.       An electronic signature was used to authenticate this note.   -- Jr Valladares MD

## 2023-03-14 NOTE — PROGRESS NOTES
1. \"Have you been to the ER, urgent care clinic since your last visit? Hospitalized since your last visit? \" Yes When: 03/05/2023 Where: [facility] Reason for visit: TIA    2. \"Have you seen or consulted any other health care providers outside of the [address] [place] [address] since your last visit? \" No     3. For patients aged 39-70: Has the patient had a colonoscopy / FIT/ Cologuard? NA - based on age      If the patient is female:    4. For patients aged 41-77: Has the patient had a mammogram within the past 2 years? NA - based on age or sex      11. For patients aged 21-65: Has the patient had a pap smear?  NA - based on age or sex    Chief Complaint   Patient presents with    Hospital Follow Up     Visit Vitals  /76 (BP 1 Location: Left upper arm, BP Patient Position: Sitting, BP Cuff Size: Adult)   Pulse (!) 55   Temp 98 °F (36.7 °C) (Oral)   Resp 18   Ht 5' 9\" (1.753 m)   Wt 167 lb 3.2 oz (75.8 kg)   SpO2 97%   BMI 24.69 kg/m²

## 2023-03-16 ENCOUNTER — PATIENT OUTREACH (OUTPATIENT)
Dept: CASE MANAGEMENT | Age: 88
End: 2023-03-16

## 2023-03-16 ENCOUNTER — TRANSCRIBE ORDER (OUTPATIENT)
Dept: SCHEDULING | Age: 88
End: 2023-03-16

## 2023-03-16 DIAGNOSIS — R41.3 MEMORY LOSS: Primary | ICD-10-CM

## 2023-03-16 NOTE — PROGRESS NOTES
Contacted the patient for a follow up, Katy Mccrary Transitions Follow Up Call    Patient Current Location: [facility] Transition Nurse (CTN) contacted the  spouse  by telephone to follow up after admission on 3/5/2023. Reports the patient is doing ok. Saw neurologist today. Spouse handed the phone to her daughter, Rosalie Baer ([phone]) to review medications. Addressed changes since last contact: medications- picked up prescriptions from Rx  Follow up appointment completed? yes. Was follow up appointment scheduled within 7 days of discharge? yes. CTN reviewed  medical action plan and red flags with family and discussed any barriers to care and/or understanding of plan of care after discharge. Discussed appropriate site of care based on symptoms and resources available to patient including: Specialist and  Donavon Lyles. The family agrees to contact the PCP office for questions related to their healthcare. Patients top risk factors for readmission: functional cognitive ability and medical condition-TIA    Interventions to address risk factors:  [facility] follow up appointment(s):   Future Appointments   Date Time Provider Destiny Linton   6/20/2023  3:00 PM Mitch Montero MD [facility] BS [facility]     Non-[facility] follow up appointment(s): NA    CTN provided contact information for future needs. Plan for follow-up call in 7-10 days based on severity of symptoms and risk factors. Plan for next call: medication management-compliance       Goals Addressed                   This Visit's Progress     Prevent complications post hospitalization.    On track       03/10/23  Absence of falls  Teach back method used for BEFAST acronym  Will participate in [place] PT to improve strength and mobility  Will attend fu appt with neurology scheduled 3/16  Waiting return call from PCP office for earlier appt  Will perform home BP monitoring with tracking    03/16/23  Discuss ACP on next outreach  No falls  Participates in [facility] PT twice per week  Attended fu appt with neurology  Attended fu appt with PCP  Daughter reports the patient BPs are \"good\"

## 2023-03-23 ENCOUNTER — PATIENT OUTREACH (OUTPATIENT)
Dept: CASE MANAGEMENT | Age: 88
End: 2023-03-23

## 2023-03-23 NOTE — PROGRESS NOTES
Care Transitions Follow Up Call    Attempted to reach patient for follow up today. Unable to reach patient. LMTCB      Patient: Virginia Love Patient : 10/11/1933 MRN: [id number]    Last Discharge  Street       Date Complaint Diagnosis Description Type Department Provider    3/5/23 Dysarthria TIA (transient ischemic attack) ED to Hosp-Admission (Discharged) (ADMIT) Esteban Berrios MD; S...               Noted following upcoming appointments from discharge chart review:   [facility] Mcmillan follow up appointment(s):   Future Appointments   Date Time Provider Destiny Mercadoi   2023  3:00 PM Marcel Kwan MD [facility] BS AMB     Non-BS follow up appointment(s):

## 2023-03-24 ENCOUNTER — PATIENT OUTREACH (OUTPATIENT)
Dept: CASE MANAGEMENT | Age: 88
End: 2023-03-24

## 2023-03-24 NOTE — PROGRESS NOTES
Care Transitions Follow Up Call    Patient Current Location: [facility] Transition Nurse (CTN) contacted the family by telephone to follow up after admission on 3/5/2023. Daughter reports the patient is doing \"much better\". Reports his strength is improving along with his mentation. /69    Addressed changes since last contact: none      Advance Care Planning:   Does patient have an Advance Directive:  currently not on file; education provided  to [facility]    CTN reviewed medical action plan and red flags with family and discussed any barriers to care and/or understanding of plan of care after discharge. Discussed appropriate site of care based on symptoms and resources available to patient including: Home Health. Patients top risk factors for readmission:  NA    Interventions to address risk factors:  [facility] follow up appointment(s):   Future Appointments   Date Time Provider Destiny Linton   6/20/2023  3:00 PM Peña Bhatia MD [facility] BS [facility]     Non-[facility] follow up appointment(s): NA    CTN provided contact information for future needs. Plan for follow-up call in 5-7 days based on severity of symptoms and risk factors. Plan for next call:  acp       Goals Addressed                   This Visit's Progress     Prevent complications post hospitalization.           03/10/23  Absence of falls  Teach back method used for BEFAST acronym  Will participate in [facility] PT to improve strength and mobility  Will attend fu appt with neurology scheduled 3/16  Waiting return call from PCP office for earlier appt  Will perform home BP monitoring with tracking    03/16/23  Discuss ACP on next outreach  No falls  Participates in [facility] PT twice per week  Attended fu appt with neurology  Attended fu appt with PCP  Daughter reports the patient BPs are \"good\"    03/24/23  Will complete AMD

## 2023-03-30 ENCOUNTER — PATIENT OUTREACH (OUTPATIENT)
Dept: CASE MANAGEMENT | Age: 88
End: 2023-03-30

## 2023-03-30 NOTE — PROGRESS NOTES
Care Transitions Follow Up Call    Attempted to reach patient for follow up today. Unable to reach patient. LMTCB      Patient: Ishaan Salcido Patient : 10/11/1933 MRN: [id number]    Last Discharge  Street       Date Complaint Diagnosis Description Type Department Provider    3/5/23 Dysarthria TIA (transient ischemic attack) ED to Hosp-Admission (Discharged) (ADMIT) Sonny Arndt MD; S...             Noted following upcoming appointments from discharge chart review:   [facility] follow up appointment(s):   Future Appointments   Date Time Provider Destiny Linton   2023  3:00 PM Perla Campos MD [facility] BS [facility]     Non-BS follow up appointment(s):

## 2023-04-13 PROBLEM — Z09 HOSPITAL DISCHARGE FOLLOW-UP: Status: RESOLVED | Noted: 2023-03-14 | Resolved: 2023-04-13

## 2023-04-17 ENCOUNTER — TELEPHONE (OUTPATIENT)
Dept: INTERNAL MEDICINE CLINIC | Age: 88
End: 2023-04-17

## 2023-04-17 RX ORDER — PREDNISONE 5 MG/1
TABLET ORAL
Qty: 18 TABLET | Refills: 0 | Status: SHIPPED | OUTPATIENT
Start: 2023-04-17 | End: 2023-04-26

## 2023-04-17 RX ORDER — DICLOFENAC SODIUM 10 MG/G
2 GEL TOPICAL 4 TIMES DAILY
Qty: 350 G | Refills: 1 | Status: SHIPPED | OUTPATIENT
Start: 2023-04-17

## 2023-04-17 NOTE — TELEPHONE ENCOUNTER
C/O lower back pain. Wants to know if he can get something for relief or what you'd advise. Has been going on for 2 weeks. Denies lifting or pulling anything. Hasn't tried anything OTC. Does not want to go to urgent care.

## 2023-04-20 ENCOUNTER — HOSPITAL ENCOUNTER (OUTPATIENT)
Dept: NEUROLOGY | Age: 88
Discharge: HOME | End: 2023-04-20
Attending: PSYCHIATRY & NEUROLOGY
Payer: MEDICARE

## 2023-04-20 DIAGNOSIS — R41.3 MEMORY LOSS: ICD-10-CM

## 2023-04-20 PROCEDURE — 95816 EEG AWAKE AND DROWSY: CPT

## 2023-04-22 NOTE — PROCEDURES
[facility]  EEG    Name:  Scotty Ramos  MR#:  [id number]  :  10/11/1933  ACCOUNT #:  [de-identified]  DATE OF SERVICE:  2023    DIAGNOSIS:  Memory loss. DESCRIPTION:  Recording is done digitally on a computer. Electrodes are placed in a 10-20 international system. Initial recording is equipment calibration followed by biocalibration. Initial montages are bipolar montages. Tracings started with the patient awake, eyes closed, with well-formed bioccipital alpha rhythms in the 8 Hz frequency. As recording continues, the patient is hooked up to an EKG machine that shows a heart rate of 78. Tracings continue with the patient being exposed to photic stimulation without any abnormality. Hyperventilation is not done. IMPRESSION:  This is a normal EEG, awake.         Reema Garcia MD      [id number]  D:  2023 14:01  T:  2023 21:22  JOB #:  [id number]

## 2023-04-22 NOTE — PROCEDURES
[facility]  EEG    Name:  Vicki Boyce  MR#:  [id number]  :  10/11/1933  ACCOUNT #:  [de-identified]  DATE OF SERVICE:  2023      DIAGNOSIS:  Stroke. DESCRIPTION:  Recording is done digitally on the computer. Electrodes are placed in a 10-20 international system. Initial recording is equipment calibration followed by biocalibration. Initial montages are bipolar montages. Tracing started with the patient drowsy. As the recording continues, the patient is hooked up to an EKG machine that shows a heart rate of 102. Tracings continued with the patient with decreased amplitude and voltage in the right hemisphere. He is exposed to photic stimulation without any abnormality. Hyperventilation is not done. IMPRESSION:  This is an abnormal EEG showing left hemispheric decreased voltage consistent with a left hemispheric infarct.         Miguelito Hutchison MD      [id number]  D:  2023 15:19  T:  2023 2:18  JOB #:  [id number]

## 2023-05-17 RX ORDER — AMLODIPINE BESYLATE 2.5 MG/1
TABLET ORAL
Qty: 90 TABLET | Refills: 0 | Status: SHIPPED | OUTPATIENT
Start: 2023-05-17

## 2023-08-22 RX ORDER — AMLODIPINE BESYLATE 2.5 MG/1
TABLET ORAL
Qty: 90 TABLET | Refills: 2 | Status: SHIPPED | OUTPATIENT
Start: 2023-08-22

## 2024-06-26 RX ORDER — AMLODIPINE BESYLATE 2.5 MG/1
TABLET ORAL
Qty: 90 TABLET | Refills: 2 | OUTPATIENT
Start: 2024-06-26

## 2024-08-29 ENCOUNTER — APPOINTMENT (OUTPATIENT)
Facility: HOSPITAL | Age: 89
End: 2024-08-29
Payer: MEDICARE

## 2024-08-29 ENCOUNTER — HOSPITAL ENCOUNTER (EMERGENCY)
Facility: HOSPITAL | Age: 89
Discharge: HOME | End: 2024-08-29
Attending: EMERGENCY MEDICINE
Payer: MEDICARE

## 2024-08-29 VITALS
SYSTOLIC BLOOD PRESSURE: 173 MMHG | HEIGHT: 66 IN | RESPIRATION RATE: 20 BRPM | BODY MASS INDEX: 29.89 KG/M2 | OXYGEN SATURATION: 97 % | TEMPERATURE: 97.8 F | DIASTOLIC BLOOD PRESSURE: 92 MMHG | WEIGHT: 186 LBS | HEART RATE: 72 BPM

## 2024-08-29 DIAGNOSIS — I63.9 CEREBROVASCULAR ACCIDENT (CVA), UNSPECIFIED MECHANISM (HCC): Primary | ICD-10-CM

## 2024-08-29 LAB
ALBUMIN SERPL-MCNC: 3.8 G/DL (ref 3.5–5)
ALBUMIN/GLOB SERPL: 1 (ref 1.1–2.2)
ALP SERPL-CCNC: 134 U/L (ref 45–117)
ALT SERPL-CCNC: 26 U/L (ref 12–78)
ANION GAP SERPL CALC-SCNC: 5 MMOL/L (ref 5–15)
AST SERPL W P-5'-P-CCNC: 14 U/L (ref 15–37)
BASOPHILS # BLD: 0.1 K/UL (ref 0–0.1)
BASOPHILS NFR BLD: 1 % (ref 0–1)
BILIRUB SERPL-MCNC: 0.6 MG/DL (ref 0.2–1)
BUN SERPL-MCNC: 24 MG/DL (ref 6–20)
BUN/CREAT SERPL: 15 (ref 12–20)
CA-I BLD-MCNC: 8.9 MG/DL (ref 8.5–10.1)
CHLORIDE SERPL-SCNC: 110 MMOL/L (ref 97–108)
CO2 SERPL-SCNC: 26 MMOL/L (ref 21–32)
CREAT SERPL-MCNC: 1.61 MG/DL (ref 0.7–1.3)
DIFFERENTIAL METHOD BLD: ABNORMAL
EKG ATRIAL RATE: 71 BPM
EKG DIAGNOSIS: NORMAL
EKG P AXIS: 75 DEGREES
EKG P-R INTERVAL: 208 MS
EKG Q-T INTERVAL: 412 MS
EKG QRS DURATION: 98 MS
EKG QTC CALCULATION (BAZETT): 447 MS
EKG R AXIS: -6 DEGREES
EKG T AXIS: 64 DEGREES
EKG VENTRICULAR RATE: 71 BPM
EOSINOPHIL # BLD: 0.3 K/UL (ref 0–0.4)
EOSINOPHIL NFR BLD: 5 % (ref 0–7)
ERYTHROCYTE [DISTWIDTH] IN BLOOD BY AUTOMATED COUNT: 13.8 % (ref 11.5–14.5)
GLOBULIN SER CALC-MCNC: 3.9 G/DL (ref 2–4)
GLUCOSE BLD STRIP.AUTO-MCNC: 92 MG/DL (ref 65–100)
GLUCOSE SERPL-MCNC: 97 MG/DL (ref 65–100)
HCT VFR BLD AUTO: 43.1 % (ref 36.6–50.3)
HGB BLD-MCNC: 14.6 G/DL (ref 12.1–17)
IMM GRANULOCYTES # BLD AUTO: 0 K/UL (ref 0–0.04)
IMM GRANULOCYTES NFR BLD AUTO: 1 % (ref 0–0.5)
INR PPP: 0.9 (ref 0.9–1.1)
LYMPHOCYTES # BLD: 2 K/UL (ref 0.8–3.5)
LYMPHOCYTES NFR BLD: 31 % (ref 12–49)
MCH RBC QN AUTO: 30.5 PG (ref 26–34)
MCHC RBC AUTO-ENTMCNC: 33.9 G/DL (ref 30–36.5)
MCV RBC AUTO: 90 FL (ref 80–99)
MONOCYTES # BLD: 0.8 K/UL (ref 0–1)
MONOCYTES NFR BLD: 13 % (ref 5–13)
NEUTS SEG # BLD: 3.3 K/UL (ref 1.8–8)
NEUTS SEG NFR BLD: 49 % (ref 32–75)
NRBC # BLD: 0 K/UL (ref 0–0.01)
NRBC BLD-RTO: 0 PER 100 WBC
PERFORMED BY:: NORMAL
PLATELET # BLD AUTO: 172 K/UL (ref 150–400)
PMV BLD AUTO: 10.5 FL (ref 8.9–12.9)
POTASSIUM SERPL-SCNC: 3.7 MMOL/L (ref 3.5–5.1)
PROT SERPL-MCNC: 7.7 G/DL (ref 6.4–8.2)
PROTHROMBIN TIME: 12.9 SEC (ref 11.9–14.6)
RBC # BLD AUTO: 4.79 M/UL (ref 4.1–5.7)
SODIUM SERPL-SCNC: 141 MMOL/L (ref 136–145)
TROPONIN I SERPL HS-MCNC: 13 NG/L (ref 0–76)
WBC # BLD AUTO: 6.5 K/UL (ref 4.1–11.1)

## 2024-08-29 PROCEDURE — 70450 CT HEAD/BRAIN W/O DYE: CPT

## 2024-08-29 PROCEDURE — 70498 CT ANGIOGRAPHY NECK: CPT

## 2024-08-29 PROCEDURE — 6360000004 HC RX CONTRAST MEDICATION: Performed by: EMERGENCY MEDICINE

## 2024-08-29 PROCEDURE — 71045 X-RAY EXAM CHEST 1 VIEW: CPT

## 2024-08-29 PROCEDURE — 85610 PROTHROMBIN TIME: CPT

## 2024-08-29 PROCEDURE — 93005 ELECTROCARDIOGRAM TRACING: CPT | Performed by: EMERGENCY MEDICINE

## 2024-08-29 PROCEDURE — 0042T CT BRAIN PERFUSION: CPT

## 2024-08-29 PROCEDURE — 99285 EMERGENCY DEPT VISIT HI MDM: CPT

## 2024-08-29 PROCEDURE — 84484 ASSAY OF TROPONIN QUANT: CPT

## 2024-08-29 PROCEDURE — 36415 COLL VENOUS BLD VENIPUNCTURE: CPT

## 2024-08-29 PROCEDURE — 85025 COMPLETE CBC W/AUTO DIFF WBC: CPT

## 2024-08-29 PROCEDURE — 70496 CT ANGIOGRAPHY HEAD: CPT

## 2024-08-29 PROCEDURE — 82962 GLUCOSE BLOOD TEST: CPT

## 2024-08-29 PROCEDURE — 80053 COMPREHEN METABOLIC PANEL: CPT

## 2024-08-29 RX ORDER — LABETALOL HYDROCHLORIDE 5 MG/ML
20 INJECTION, SOLUTION INTRAVENOUS
Status: DISCONTINUED | OUTPATIENT
Start: 2024-08-29 | End: 2024-08-29

## 2024-08-29 RX ORDER — IOPAMIDOL 755 MG/ML
100 INJECTION, SOLUTION INTRAVASCULAR
Status: COMPLETED | OUTPATIENT
Start: 2024-08-29 | End: 2024-08-29

## 2024-08-29 RX ORDER — ASPIRIN 81 MG/1
162 TABLET, CHEWABLE ORAL
Status: DISCONTINUED | OUTPATIENT
Start: 2024-08-29 | End: 2024-08-29 | Stop reason: HOSPADM

## 2024-08-29 RX ADMIN — IOPAMIDOL 100 ML: 755 INJECTION, SOLUTION INTRAVENOUS at 15:13

## 2024-08-29 ASSESSMENT — LIFESTYLE VARIABLES
HOW OFTEN DO YOU HAVE A DRINK CONTAINING ALCOHOL: NEVER
HOW MANY STANDARD DRINKS CONTAINING ALCOHOL DO YOU HAVE ON A TYPICAL DAY: PATIENT DOES NOT DRINK

## 2024-08-29 NOTE — ED PROVIDER NOTES
[facility] EMERGENCY DEPT  EMERGENCY DEPARTMENT HISTORY AND PHYSICAL EXAM      Date: 8/29/2024  Patient Name: Giovanni Sanford  MRN: [id number]  Birthdate 10/11/1933  Date of evaluation: 8/29/2024  Provider: Loren Aguayo MD   Note Started: 3:15 PM EDT 8/29/24    HISTORY OF PRESENT ILLNESS     Chief Complaint   Patient presents with    Aphasia       History Provided By: Patient    HPI: Giovanni Sanford is a 89 y.o. male patient is not on anticoagulation.  Patient with a TIA in 2023 when he presented with dysarthria.  15 minutes ago patient developed a right sided facial droop and dysarthria.  Immediately came to the hospital.  He has no falls or trauma.  Not on new anticoagulation.  No chest pain or shortness of breath.    PAST MEDICAL HISTORY   Past Medical History:  Past Medical History:   Diagnosis Date    Arrhythmia     Arthritis     Calculus of kidney     GERD (gastroesophageal reflux disease)     Headache     Hypercholesterolemia     Hypertension     Prostate disease     TIA (transient ischemic attack) 03/05/2023       Past Surgical History:  Past Surgical History:   Procedure Laterality Date    CHOLECYSTECTOMY      COLONOSCOPY      GI      PROSTATE SURGERY      prostate biopsy        Family History:  Family History   Problem Relation Age of Onset    No Known Problems Father     Heart Disease Mother        Social History:  Social History     Tobacco Use    Smoking status: Never    Smokeless tobacco: Never   Substance Use Topics    Alcohol use: Not Currently    Drug use: Never       Allergies:  Allergies   Allergen Reactions    Hydrocodone Other (See Comments)     Side effects in form of balance problem    Sulfa Antibiotics Rash    Lorazepam Itching       PCP: Patricia Jack MD    Current Meds:   Current Facility-Administered Medications   Medication Dose Route Frequency Provider Last Rate Last Admin    aspirin chewable tablet 162 mg  162 mg Oral NOW Loren Aguayo MD         Current Outpatient    Basophils % 1   Immature Granulocytes % 1(!)   Neutrophils Absolute 3.3   Lymphocytes Absolute 2.0   Monocytes Absolute 0.8   Eosinophils Absolute 0.3   Basophils Absolute 0.1   Immature Granulocytes Absolute 0.0   Differential Type AUTOMATED  Lab work interpreted independently by ER physician as NORMAL   [HP]   1631 CT head CTA perfusion reviewed with the neurologist.  No thrombectomy candidate is indicated. [HP]   1632 Discussed admission with patient and he has declined will give a dose of aspirin here in the ER and discharge AGAINST MEDICAL ADVICE with a neurology outpatient follow-up. [HP]   1635 EKG interpretation:  Normal sinus rhythm. Rate 71. No ST segment changes. No T wave Inversions. Normal Intervals. Interpreted by ED physician. Reason rule out dysarrythmia     [HP]      ED Course User Index  [HP] Loren Aguayo MD       SEPSIS Reassessment: Sepsis reassessment not applicable    Clinical Management Tools:  Not Applicable    Patient was given the following medications:  Medications   aspirin chewable tablet 162 mg (has no administration in time range)   iopamidol (ISOVUE-370) 76 % injection 100 mL (100 mLs IntraVENous Given 8/29/24 1513)       CONSULTS: See ED Course/MDM for further details.  IP CONSULT TO TELE-NEUROLOGY  IP CONSULT TO TELE-NEUROLOGY     Social Determinants affecting Diagnosis/Treatment: None    Smoking Cessation: Not Applicable    PROCEDURES   Unless otherwise noted above, none  Procedures      CRITICAL CARE TIME   CRITICAL CARE NOTE :    4:35 PM    IMPENDING DETERIORATION -Airway, Respiratory, Cardiovascular, and CNS  ASSOCIATED RISK FACTORS - CNS Decompensation  MANAGEMENT- Bedside Assessment and Supervision of Care  INTERPRETATION -  CT Scan  INTERVENTIONS - hemodynamic mgmt  CASE REVIEW - Medical Sub-Specialist  TREATMENT RESPONSE -Improved  PERFORMED BY - Self    NOTES:  I have spent 36 minutes of critical care time involved in lab review, consultations with specialist,

## 2024-08-29 NOTE — ED TRIAGE NOTES
Patient presents to ED from lobby with facial droop and speech difficulties that started 15 minutes PTA.

## 2024-08-29 NOTE — DISCHARGE INSTRUCTIONS
Thank you for choosing our Emergency Department for your care.  It is our privilege to care for you in your time of need.  In the next several days, you may receive a survey via email or mailed to your home about your experience with our team.  We would greatly appreciate you taking a few minutes to complete the survey, as we use this information to learn what we have done well and what we could be doing better. Thank you for trusting us with your care!    Below you will find a list of your tests from today's visit.   Labs  Recent Results (from the past 12 hour(s))   POCT Glucose    Collection Time: 08/29/24  2:59 PM   Result Value Ref Range    POC Glucose 92 65 - 100 mg/dL    Performed by: Ramon Concepcion    CBC with Auto Differential    Collection Time: 08/29/24  3:15 PM   Result Value Ref Range    WBC 6.5 4.1 - 11.1 K/uL    RBC 4.79 4.10 - 5.70 M/uL    Hemoglobin 14.6 12.1 - 17.0 g/dL    Hematocrit 43.1 36.6 - 50.3 %    MCV 90.0 80.0 - 99.0 FL    MCH 30.5 26.0 - 34.0 PG    MCHC 33.9 30.0 - 36.5 g/dL    RDW 13.8 11.5 - 14.5 %    Platelets 172 150 - 400 K/uL    MPV 10.5 8.9 - 12.9 FL    Nucleated RBCs 0.0 0.0  WBC    nRBC 0.00 0.00 - 0.01 K/uL    Neutrophils % 49 32 - 75 %    Lymphocytes % 31 12 - 49 %    Monocytes % 13 5 - 13 %    Eosinophils % 5 0 - 7 %    Basophils % 1 0 - 1 %    Immature Granulocytes % 1 (H) 0 - 0.5 %    Neutrophils Absolute 3.3 1.8 - 8.0 K/UL    Lymphocytes Absolute 2.0 0.8 - 3.5 K/UL    Monocytes Absolute 0.8 0.0 - 1.0 K/UL    Eosinophils Absolute 0.3 0.0 - 0.4 K/UL    Basophils Absolute 0.1 0.0 - 0.1 K/UL    Immature Granulocytes Absolute 0.0 0.00 - 0.04 K/UL    Differential Type AUTOMATED     Comprehensive Metabolic Panel    Collection Time: 08/29/24  3:15 PM   Result Value Ref Range    Sodium 141 136 - 145 mmol/L    Potassium 3.7 3.5 - 5.1 mmol/L    Chloride 110 (H) 97 - 108 mmol/L    CO2 26 21 - 32 mmol/L    Anion Gap 5 5 - 15 mmol/L    Glucose 97 65 - 100 mg/dL    BUN 24 (H) 6 -

## 2024-09-24 ENCOUNTER — TELEPHONE (OUTPATIENT)
Dept: OTHER | Facility: CLINIC | Age: 89
End: 2024-09-24